# Patient Record
Sex: FEMALE | Race: WHITE | Employment: OTHER | URBAN - METROPOLITAN AREA
[De-identification: names, ages, dates, MRNs, and addresses within clinical notes are randomized per-mention and may not be internally consistent; named-entity substitution may affect disease eponyms.]

---

## 2017-01-28 ENCOUNTER — HOSPITAL ENCOUNTER (OUTPATIENT)
Dept: WOMENS IMAGING | Age: 67
Discharge: HOME | End: 2017-01-28
Attending: FAMILY MEDICINE | Admitting: FAMILY MEDICINE

## 2017-01-28 DIAGNOSIS — Z12.31 VISIT FOR SCREENING MAMMOGRAM: ICD-10-CM

## 2018-05-08 ENCOUNTER — HOSPITAL ENCOUNTER (OUTPATIENT)
Dept: WOMENS IMAGING | Age: 68
Discharge: HOME | End: 2018-05-08
Admitting: OBSTETRICS & GYNECOLOGY

## 2018-05-08 DIAGNOSIS — Z13.820 ENCOUNTER FOR SCREENING FOR OSTEOPOROSIS: ICD-10-CM

## 2018-05-08 DIAGNOSIS — Z13.820 OSTEOPOROSIS SCREENING: ICD-10-CM

## 2018-05-08 DIAGNOSIS — Z12.39 BREAST CANCER SCREENING: ICD-10-CM

## 2019-07-12 ENCOUNTER — HOSPITAL ENCOUNTER (OUTPATIENT)
Dept: WOMENS IMAGING | Age: 69
Discharge: HOME | End: 2019-07-12
Payer: MEDICARE

## 2019-07-12 DIAGNOSIS — Z12.31 VISIT FOR SCREENING MAMMOGRAM: ICD-10-CM

## 2019-07-12 PROCEDURE — 77067 SCR MAMMO BI INCL CAD: CPT

## 2019-07-25 RX ORDER — AMITRIPTYLINE HYDROCHLORIDE 25 MG/1
TABLET, FILM COATED ORAL NIGHTLY
COMMUNITY
Start: 2019-02-04

## 2019-07-25 RX ORDER — MULTIVIT WITH MINERALS/LUTEIN
1000 TABLET ORAL
COMMUNITY

## 2019-07-25 RX ORDER — MELOXICAM 15 MG/1
TABLET ORAL NIGHTLY
COMMUNITY
Start: 2019-07-10 | End: 2019-07-25 | Stop reason: SDUPTHER

## 2019-07-25 RX ORDER — MELOXICAM 15 MG/1
15 TABLET ORAL
COMMUNITY

## 2019-07-25 RX ORDER — UBIDECARENONE 30 MG
1 CAPSULE ORAL DAILY
COMMUNITY

## 2019-07-25 RX ORDER — SERTRALINE HYDROCHLORIDE 25 MG/1
25 TABLET, FILM COATED ORAL
COMMUNITY
Start: 2018-08-29

## 2019-07-25 RX ORDER — TIZANIDINE 4 MG/1
TABLET ORAL NIGHTLY
COMMUNITY
Start: 2019-06-18

## 2019-07-25 RX ORDER — ASPIRIN 81 MG/1
81 TABLET ORAL
COMMUNITY

## 2019-07-25 RX ORDER — LEVOTHYROXINE SODIUM 0.05 MG/1
TABLET ORAL DAILY
COMMUNITY
Start: 2013-05-22

## 2019-07-25 RX ORDER — VALACYCLOVIR HYDROCHLORIDE 1 G/1
1000 TABLET, FILM COATED ORAL DAILY PRN
COMMUNITY
Start: 2019-04-17

## 2019-07-25 RX ORDER — OMEPRAZOLE 20 MG/1
CAPSULE, DELAYED RELEASE ORAL DAILY
COMMUNITY
Start: 2019-06-15 | End: 2021-09-14

## 2019-07-30 ENCOUNTER — APPOINTMENT (OUTPATIENT)
Dept: GENERAL RADIOLOGY | Age: 69
End: 2019-07-30
Attending: PODIATRIST
Payer: MEDICARE

## 2019-07-30 ENCOUNTER — ANESTHESIA (OUTPATIENT)
Dept: OPERATING ROOM | Age: 69
End: 2019-07-30
Payer: MEDICARE

## 2019-07-30 ENCOUNTER — ANESTHESIA EVENT (OUTPATIENT)
Dept: OPERATING ROOM | Age: 69
End: 2019-07-30
Payer: MEDICARE

## 2019-07-30 ENCOUNTER — HOSPITAL ENCOUNTER (OUTPATIENT)
Age: 69
Setting detail: OUTPATIENT SURGERY
Discharge: HOME | End: 2019-07-30
Attending: PODIATRIST | Admitting: PODIATRIST
Payer: MEDICARE

## 2019-07-30 VITALS
BODY MASS INDEX: 28.25 KG/M2 | HEART RATE: 70 BPM | DIASTOLIC BLOOD PRESSURE: 70 MMHG | SYSTOLIC BLOOD PRESSURE: 135 MMHG | RESPIRATION RATE: 12 BRPM | TEMPERATURE: 97.8 F | OXYGEN SATURATION: 100 % | WEIGHT: 165.5 LBS | HEIGHT: 64 IN

## 2019-07-30 VITALS — OXYGEN SATURATION: 100 % | SYSTOLIC BLOOD PRESSURE: 103 MMHG | DIASTOLIC BLOOD PRESSURE: 51 MMHG

## 2019-07-30 DIAGNOSIS — G89.18 POST-OP PAIN: Primary | ICD-10-CM

## 2019-07-30 PROCEDURE — 2500000003 HC RX 250 WO HCPCS: Performed by: PODIATRIST

## 2019-07-30 PROCEDURE — 2580000003 HC RX 258: Performed by: NURSE ANESTHETIST, CERTIFIED REGISTERED

## 2019-07-30 PROCEDURE — 7100000010 HC PHASE II RECOVERY - FIRST 15 MIN: Performed by: PODIATRIST

## 2019-07-30 PROCEDURE — 3600000012 HC SURGERY LEVEL 2 ADDTL 15MIN: Performed by: PODIATRIST

## 2019-07-30 PROCEDURE — 2500000003 HC RX 250 WO HCPCS: Performed by: NURSE ANESTHETIST, CERTIFIED REGISTERED

## 2019-07-30 PROCEDURE — 2580000003 HC RX 258: Performed by: PODIATRIST

## 2019-07-30 PROCEDURE — 2709999900 HC NON-CHARGEABLE SUPPLY: Performed by: PODIATRIST

## 2019-07-30 PROCEDURE — 3700000000 HC ANESTHESIA ATTENDED CARE: Performed by: PODIATRIST

## 2019-07-30 PROCEDURE — 3700000001 HC ADD 15 MINUTES (ANESTHESIA): Performed by: PODIATRIST

## 2019-07-30 PROCEDURE — 73630 X-RAY EXAM OF FOOT: CPT

## 2019-07-30 PROCEDURE — 7100000000 HC PACU RECOVERY - FIRST 15 MIN: Performed by: PODIATRIST

## 2019-07-30 PROCEDURE — 3600000002 HC SURGERY LEVEL 2 BASE: Performed by: PODIATRIST

## 2019-07-30 PROCEDURE — 7100000001 HC PACU RECOVERY - ADDTL 15 MIN: Performed by: PODIATRIST

## 2019-07-30 PROCEDURE — 7100000011 HC PHASE II RECOVERY - ADDTL 15 MIN: Performed by: PODIATRIST

## 2019-07-30 PROCEDURE — 6360000002 HC RX W HCPCS: Performed by: NURSE ANESTHETIST, CERTIFIED REGISTERED

## 2019-07-30 PROCEDURE — 6370000000 HC RX 637 (ALT 250 FOR IP): Performed by: PODIATRIST

## 2019-07-30 PROCEDURE — 6360000002 HC RX W HCPCS: Performed by: PODIATRIST

## 2019-07-30 RX ORDER — GINSENG 100 MG
CAPSULE ORAL
Status: COMPLETED | OUTPATIENT
Start: 2019-07-30 | End: 2019-07-30

## 2019-07-30 RX ORDER — LIDOCAINE HYDROCHLORIDE 10 MG/ML
0.5 INJECTION, SOLUTION EPIDURAL; INFILTRATION; INTRACAUDAL; PERINEURAL ONCE
Status: DISCONTINUED | OUTPATIENT
Start: 2019-07-30 | End: 2019-07-30 | Stop reason: HOSPADM

## 2019-07-30 RX ORDER — FENTANYL CITRATE 50 UG/ML
25 INJECTION, SOLUTION INTRAMUSCULAR; INTRAVENOUS EVERY 5 MIN PRN
Status: DISCONTINUED | OUTPATIENT
Start: 2019-07-30 | End: 2019-07-30 | Stop reason: HOSPADM

## 2019-07-30 RX ORDER — SODIUM CHLORIDE 0.9 % (FLUSH) 0.9 %
10 SYRINGE (ML) INJECTION EVERY 12 HOURS SCHEDULED
Status: CANCELLED | OUTPATIENT
Start: 2019-07-30

## 2019-07-30 RX ORDER — PROCHLORPERAZINE EDISYLATE 5 MG/ML
5 INJECTION INTRAMUSCULAR; INTRAVENOUS
Status: DISCONTINUED | OUTPATIENT
Start: 2019-07-30 | End: 2019-07-30 | Stop reason: HOSPADM

## 2019-07-30 RX ORDER — LIDOCAINE HYDROCHLORIDE 10 MG/ML
INJECTION, SOLUTION EPIDURAL; INFILTRATION; INTRACAUDAL; PERINEURAL
Status: COMPLETED | OUTPATIENT
Start: 2019-07-30 | End: 2019-07-30

## 2019-07-30 RX ORDER — HYDROCODONE BITARTRATE AND ACETAMINOPHEN 5; 325 MG/1; MG/1
1 TABLET ORAL EVERY 4 HOURS PRN
Qty: 30 TABLET | Refills: 0 | Status: SHIPPED | OUTPATIENT
Start: 2019-07-30 | End: 2019-08-04

## 2019-07-30 RX ORDER — FENTANYL CITRATE 50 UG/ML
INJECTION, SOLUTION INTRAMUSCULAR; INTRAVENOUS PRN
Status: DISCONTINUED | OUTPATIENT
Start: 2019-07-30 | End: 2019-07-30 | Stop reason: SDUPTHER

## 2019-07-30 RX ORDER — LABETALOL HYDROCHLORIDE 5 MG/ML
5 INJECTION, SOLUTION INTRAVENOUS EVERY 10 MIN PRN
Status: DISCONTINUED | OUTPATIENT
Start: 2019-07-30 | End: 2019-07-30 | Stop reason: HOSPADM

## 2019-07-30 RX ORDER — SODIUM CHLORIDE, SODIUM LACTATE, POTASSIUM CHLORIDE, CALCIUM CHLORIDE 600; 310; 30; 20 MG/100ML; MG/100ML; MG/100ML; MG/100ML
INJECTION, SOLUTION INTRAVENOUS CONTINUOUS
Status: DISCONTINUED | OUTPATIENT
Start: 2019-07-30 | End: 2019-07-30 | Stop reason: HOSPADM

## 2019-07-30 RX ORDER — SODIUM CHLORIDE, SODIUM LACTATE, POTASSIUM CHLORIDE, CALCIUM CHLORIDE 600; 310; 30; 20 MG/100ML; MG/100ML; MG/100ML; MG/100ML
INJECTION, SOLUTION INTRAVENOUS CONTINUOUS
Status: CANCELLED | OUTPATIENT
Start: 2019-07-30

## 2019-07-30 RX ORDER — LIDOCAINE HYDROCHLORIDE 20 MG/ML
INJECTION, SOLUTION EPIDURAL; INFILTRATION; INTRACAUDAL; PERINEURAL PRN
Status: DISCONTINUED | OUTPATIENT
Start: 2019-07-30 | End: 2019-07-30 | Stop reason: SDUPTHER

## 2019-07-30 RX ORDER — ONDANSETRON 2 MG/ML
4 INJECTION INTRAMUSCULAR; INTRAVENOUS
Status: DISCONTINUED | OUTPATIENT
Start: 2019-07-30 | End: 2019-07-30 | Stop reason: HOSPADM

## 2019-07-30 RX ORDER — LIDOCAINE HYDROCHLORIDE 10 MG/ML
1 INJECTION, SOLUTION EPIDURAL; INFILTRATION; INTRACAUDAL; PERINEURAL
Status: CANCELLED | OUTPATIENT
Start: 2019-07-30 | End: 2019-07-30

## 2019-07-30 RX ORDER — BUPIVACAINE HYDROCHLORIDE 5 MG/ML
INJECTION, SOLUTION EPIDURAL; INTRACAUDAL
Status: COMPLETED | OUTPATIENT
Start: 2019-07-30 | End: 2019-07-30

## 2019-07-30 RX ORDER — SODIUM CHLORIDE 0.9 % (FLUSH) 0.9 %
10 SYRINGE (ML) INJECTION PRN
Status: CANCELLED | OUTPATIENT
Start: 2019-07-30

## 2019-07-30 RX ORDER — SODIUM CHLORIDE 9 MG/ML
INJECTION, SOLUTION INTRAVENOUS CONTINUOUS PRN
Status: DISCONTINUED | OUTPATIENT
Start: 2019-07-30 | End: 2019-07-30 | Stop reason: SDUPTHER

## 2019-07-30 RX ORDER — HYDRALAZINE HYDROCHLORIDE 20 MG/ML
5 INJECTION INTRAMUSCULAR; INTRAVENOUS EVERY 10 MIN PRN
Status: DISCONTINUED | OUTPATIENT
Start: 2019-07-30 | End: 2019-07-30 | Stop reason: HOSPADM

## 2019-07-30 RX ORDER — HYDROMORPHONE HCL 110MG/55ML
0.5 PATIENT CONTROLLED ANALGESIA SYRINGE INTRAVENOUS EVERY 5 MIN PRN
Status: DISCONTINUED | OUTPATIENT
Start: 2019-07-30 | End: 2019-07-30 | Stop reason: HOSPADM

## 2019-07-30 RX ORDER — OXYCODONE HYDROCHLORIDE AND ACETAMINOPHEN 5; 325 MG/1; MG/1
1 TABLET ORAL
Status: DISCONTINUED | OUTPATIENT
Start: 2019-07-30 | End: 2019-07-30 | Stop reason: HOSPADM

## 2019-07-30 RX ORDER — PROPOFOL 10 MG/ML
INJECTION, EMULSION INTRAVENOUS CONTINUOUS PRN
Status: DISCONTINUED | OUTPATIENT
Start: 2019-07-30 | End: 2019-07-30 | Stop reason: SDUPTHER

## 2019-07-30 RX ORDER — CEFAZOLIN SODIUM 2 G/100ML
2 INJECTION, SOLUTION INTRAVENOUS
Status: COMPLETED | OUTPATIENT
Start: 2019-07-30 | End: 2019-07-30

## 2019-07-30 RX ADMIN — SODIUM CHLORIDE: 9 INJECTION, SOLUTION INTRAVENOUS at 09:14

## 2019-07-30 RX ADMIN — LIDOCAINE HYDROCHLORIDE 100 MG: 20 INJECTION, SOLUTION EPIDURAL; INFILTRATION; INTRACAUDAL; PERINEURAL at 09:14

## 2019-07-30 RX ADMIN — PROPOFOL 140 MCG/KG/MIN: 10 INJECTION, EMULSION INTRAVENOUS at 09:14

## 2019-07-30 RX ADMIN — CEFAZOLIN SODIUM 2 G: 2 INJECTION, SOLUTION INTRAVENOUS at 09:08

## 2019-07-30 RX ADMIN — FENTANYL CITRATE 50 MCG: 50 INJECTION, SOLUTION INTRAMUSCULAR; INTRAVENOUS at 09:17

## 2019-07-30 RX ADMIN — SODIUM CHLORIDE, POTASSIUM CHLORIDE, SODIUM LACTATE AND CALCIUM CHLORIDE: 600; 310; 30; 20 INJECTION, SOLUTION INTRAVENOUS at 07:52

## 2019-07-30 ASSESSMENT — PULMONARY FUNCTION TESTS
PIF_VALUE: 1
PIF_VALUE: 0
PIF_VALUE: 1
PIF_VALUE: 1
PIF_VALUE: 0
PIF_VALUE: 1
PIF_VALUE: 0
PIF_VALUE: 1
PIF_VALUE: 0
PIF_VALUE: 1

## 2019-07-30 ASSESSMENT — PAIN DESCRIPTION - DESCRIPTORS: DESCRIPTORS: ACHING

## 2019-07-30 ASSESSMENT — PAIN - FUNCTIONAL ASSESSMENT
PAIN_FUNCTIONAL_ASSESSMENT: PREVENTS OR INTERFERES SOME ACTIVE ACTIVITIES AND ADLS
PAIN_FUNCTIONAL_ASSESSMENT: 0-10

## 2019-07-30 ASSESSMENT — ENCOUNTER SYMPTOMS: SHORTNESS OF BREATH: 0

## 2019-07-30 NOTE — BRIEF OP NOTE
Brief Postoperative Note  ______________________________________________________________    Patient: Pérez Gomez  YOB: 1950  MRN: [id number]  Date of Procedure: 7/30/2019    Pre-Op Diagnosis: PLANTAR FASCIITIS LEFT FOOT M72.2    Post-Op Diagnosis: Same       Procedure(s):  INSTEP PROCEDURE LEFT FOOT    Anesthesia: Monitor Anesthesia Care    Surgeon(s):  Linh Espino DPM    Assistant:   Kieran Garcia DPM PGY-3  Renae Hernandez MS-4     Estimated Blood Loss (mL): less than 50     Hemostasis: ankle tourniquet set at 250mmhg for 19 min    Injections: 10cc lidocaine plain 1%, 10cc marcaine plain 4.3%    Complications: None    Specimens:   * No specimens in log *    Implants:  * No implants in log *      Drains: * No LDAs found *    Findings: as expected.      Kathleen Gonzalez DPM  Date: 7/30/2019  Time: 9:53 AM

## 2019-07-30 NOTE — ANESTHESIA POSTPROCEDURE EVALUATION
Department of Anesthesiology  Postprocedure Note    Patient: Galina Khan  MRN: [id number]  YOB: 1950  Date of evaluation: 7/30/2019  Time:  11:00 AM     Procedure Summary     Date:  07/30/19 Room / Location:  [facility] OR [address] [place] [address] ASC OR    Anesthesia Start:  0910 Anesthesia Stop:  1000    Procedure:  INSTEP PROCEDURE LEFT FOOT (Left Foot) Diagnosis:  (PLANTAR FASCIITIS LEFT FOOT M72.2)    Surgeon:  Ramya Nova DPM Responsible Provider:  Alysha Can MD    Anesthesia Type:  MAC ASA Status:  2          Anesthesia Type: MAC    Neo Phase I: Neo Score: 8    Neo Phase II:      Last vitals: Reviewed and per EMR flowsheets.        Anesthesia Post Evaluation    Patient location during evaluation: PACU  Patient participation: complete - patient participated  Level of consciousness: awake and alert  Airway patency: patent  Nausea & Vomiting: no nausea and no vomiting  Complications: no  Cardiovascular status: hemodynamically stable  Respiratory status: acceptable  Hydration status: stable

## 2019-07-30 NOTE — PROGRESS NOTES
Discharge instructions reviewed with patient/responsible adult. All home medications have been reviewed, questions answered and patient and daughter verbalized understanding. Discharge instructions signed and copies given. Patient discharged per w/c with belongings.

## 2020-10-02 ENCOUNTER — HOSPITAL ENCOUNTER (OUTPATIENT)
Dept: WOMENS IMAGING | Age: 70
Discharge: HOME | End: 2020-10-02
Payer: MEDICARE

## 2020-10-02 PROCEDURE — 77063 BREAST TOMOSYNTHESIS BI: CPT

## 2021-08-03 ENCOUNTER — OFFICE VISIT (OUTPATIENT)
Dept: ENT CLINIC | Age: 71
End: 2021-08-03
Payer: MEDICARE

## 2021-08-03 VITALS
BODY MASS INDEX: 24.24 KG/M2 | HEIGHT: 64 IN | DIASTOLIC BLOOD PRESSURE: 90 MMHG | WEIGHT: 142 LBS | SYSTOLIC BLOOD PRESSURE: 140 MMHG

## 2021-08-03 DIAGNOSIS — S02.2XXA CLOSED FRACTURE OF NASAL BONE, INITIAL ENCOUNTER: Primary | ICD-10-CM

## 2021-08-03 DIAGNOSIS — J34.89 NASAL OBSTRUCTION: ICD-10-CM

## 2021-08-03 DIAGNOSIS — J31.0 CHRONIC RHINITIS: ICD-10-CM

## 2021-08-03 DIAGNOSIS — J34.2 DEVIATED NASAL SEPTUM: ICD-10-CM

## 2021-08-03 DIAGNOSIS — R04.0 EPISTAXIS: ICD-10-CM

## 2021-08-03 PROCEDURE — G8420 CALC BMI NORM PARAMETERS: HCPCS | Performed by: STUDENT IN AN ORGANIZED HEALTH CARE EDUCATION/TRAINING PROGRAM

## 2021-08-03 PROCEDURE — 1123F ACP DISCUSS/DSCN MKR DOCD: CPT | Performed by: STUDENT IN AN ORGANIZED HEALTH CARE EDUCATION/TRAINING PROGRAM

## 2021-08-03 PROCEDURE — 1090F PRES/ABSN URINE INCON ASSESS: CPT | Performed by: STUDENT IN AN ORGANIZED HEALTH CARE EDUCATION/TRAINING PROGRAM

## 2021-08-03 PROCEDURE — 4004F PT TOBACCO SCREEN RCVD TLK: CPT | Performed by: STUDENT IN AN ORGANIZED HEALTH CARE EDUCATION/TRAINING PROGRAM

## 2021-08-03 PROCEDURE — 4040F PNEUMOC VAC/ADMIN/RCVD: CPT | Performed by: STUDENT IN AN ORGANIZED HEALTH CARE EDUCATION/TRAINING PROGRAM

## 2021-08-03 PROCEDURE — 3017F COLORECTAL CA SCREEN DOC REV: CPT | Performed by: STUDENT IN AN ORGANIZED HEALTH CARE EDUCATION/TRAINING PROGRAM

## 2021-08-03 PROCEDURE — 31231 NASAL ENDOSCOPY DX: CPT | Performed by: STUDENT IN AN ORGANIZED HEALTH CARE EDUCATION/TRAINING PROGRAM

## 2021-08-03 PROCEDURE — G8427 DOCREV CUR MEDS BY ELIG CLIN: HCPCS | Performed by: STUDENT IN AN ORGANIZED HEALTH CARE EDUCATION/TRAINING PROGRAM

## 2021-08-03 PROCEDURE — 99204 OFFICE O/P NEW MOD 45 MIN: CPT | Performed by: STUDENT IN AN ORGANIZED HEALTH CARE EDUCATION/TRAINING PROGRAM

## 2021-08-03 PROCEDURE — G8399 PT W/DXA RESULTS DOCUMENT: HCPCS | Performed by: STUDENT IN AN ORGANIZED HEALTH CARE EDUCATION/TRAINING PROGRAM

## 2021-08-03 NOTE — PROGRESS NOTES
[address] [place] [address] (:  1950) is a 78 y.o. female, here for evaluation of the following chief complaint(s):  Epistaxis      ASSESSMENT/PLAN:  1. Closed fracture of nasal bone, initial encounter  2. Nasal obstruction  3. Deviated nasal septum  4. Chronic rhinitis  5. Epistaxis      This is a very pleasant 78 y.o. female here today for evaluation of the the above-noted complaints. The patient has a prior history of a traumatic fall with nasal bone fracture approximately 6 months ago. She now has a deviated nasal dorsum with a narrowed nasal valve on the right combined with a right-sided septal deviation. I suspect that this is contributing to her nasal obstruction on the side. If she were to need surgery to open up her nose she may need an open septorhinoplasty with osteotomies. Regarding the patient's chronic nasal drainage, I am suspicious this may be vasomotor rhinitis. I would like to treat her with ipratropium bromide to see if this can help. Regarding the patient's epistaxis, there are some prominent blood vessels along the right anterior nasal septum. I have asked patient to apply Vaseline to her nares and start utilizing nasal saline gel spray or mist.  If she still gets persistent nosebleeds and we will plan to cauterize that side. SUBJECTIVE/OBJECTIVE:  BAO    Joyce Alba is here today for evaluation of issues related to her nose. The patient states that approximately 6 months ago she had a fall and fractured her nose. Since that time she has had persistent nasal obstruction on that side. Additionally, she gets intermittent nasal drainage which is constant throughout the day. This is clear. She denies being able to collect in a cup and states that it is not salty or metallic in flavor. She also has been getting intermittent light nosebleeds from the right side of her nose.   This is been ongoing for some time. REVIEW OF SYSTEMS  The following systems were reviewed and revealed the following in addition to any already discussed in the HPI:    PHYSICAL EXAM    GENERAL: No acute distress, alert and oriented, no hoarseness, strong voice  EYES: EOMI, Anti-icteric  HENT:   Head: Normocephalic and atraumatic. Face:  Symmetric, facial nerve intact, no sinus tenderness  Right Ear: Normal external ear, normal external auditory canal, intact tympanic membrane with normal mobility and aerated middle ear  Left Ear: Normal external ear, normal external auditory canal, intact tympanic membrane with normal mobility and aerated middle ear  Mouth/Oral Cavity:  normal lips, Uvula is midline, no mucosal lesions, no trismus, without dentition, normal salivary quality/flow  Oropharynx/Larynx:  normal oropharynx, surgically absent tonsils; patient did not tolerate mirror exam due to excessive gag reflex  Nose:Normal external nasal appearance. Anterior rhinoscopy shows  a deviated septum preventing view posteriorly. Mild enlargement turbinates. Normal mucosa   NECK: Normal range of motion, no thyromegaly, trachea is midline, no lymphadenopathy, no neck masses, no crepitus  CHEST: Normal respiratory effort, no retractions, breathing comfortably  SKIN: No rashes, normal appearing skin, no evidence of skin lesions/tumors  Neuro:  cranial nerve II-XII intact; normal gait  Cardio:  no edema        PROCEDURE  Nasal Endoscopy (CPT code 31970)    Preop: chronic rhinitis  Postop: Same    Verbal consent was received. After topical anesthesia and decongestion had been obtained using aerosolized 1% lidocaine and oxymetazoline, a 45 degree rigid endoscope was placed into both nares with the patient in a sitting position.  The following was observed:    Right Nasal Cavity and Paranasal Sinuses:  Polyp score = 0 (0 = no polyps, 1 = small polyps in middle meatus not reaching below the inferior border of the middle ammy, 2 = polyps reaching below the middle border of the middle turbinate, 3= large polyps reaching the lower border of the inferior turbinate or polyps medial to the middle mamy, 4= large polyps causing almost complete congestion/obstruction of the interior meatus)  Edema score = 1 (0 = absent, 1 = mild, 2 = severe)  Discharge score = 1 (0 = no discharge, 1 = clear thin discharge, 2 = thick purulent discharge)    Left Nasal Cavity and Paranasal Sinuses:    Polyp score = 0 (0 = no polyps, 1 = small polyps in middle meatus not reaching below the inferior border of the middle ammy, 2 = polyps reaching below the middle border of the middle turbinate, 3= large polyps reaching the lower border of the inferior turbinate or polyps medial to the middle ammy, 4= large polyps causing almost complete congestion/obstruction of the interior meatus)  Edema score = 1 (0 = absent, 1 = mild, 2 = severe)  Discharge score = 1 (0 = no discharge, 1 = clear thin discharge, 2 = thick purulent discharge)    Septum: intact and deviated to the right  Other:   -The inferior and middle turbinates were examined. The middle meatus, and sphenoethmoid recess was examined bilaterally.    -There is evidence of a rightward nasal septal deviation with a narrow nasal valve on the right. There are some prominent blood vessels along the right anterior nasal septum. -There were no complications. Tolerated well without complication. I attest that I was present for and did the entire procedure myself. This note was generated completely or in part utilizing Dragon dictation speech recognition software. Occasionally, words are mistranscribed and despite editing, the text may contain inaccuracies due to incorrect word recognition. If further clarification is needed please contact the office at [phone]. An electronic signature was used to authenticate this note.     --Becki Etienne MD

## 2021-08-04 ENCOUNTER — TELEPHONE (OUTPATIENT)
Dept: ENT CLINIC | Age: 71
End: 2021-08-04

## 2021-08-04 RX ORDER — IPRATROPIUM BROMIDE 21 UG/1
2 SPRAY, METERED NASAL EVERY 12 HOURS
Qty: 1 BOTTLE | Refills: 3 | Status: SHIPPED | OUTPATIENT
Start: 2021-08-04

## 2021-08-26 RX ORDER — IPRATROPIUM BROMIDE 42 UG/1
SPRAY, METERED NASAL
Qty: 1 BOTTLE | Refills: 3 | Status: SHIPPED | OUTPATIENT
Start: 2021-08-26

## 2021-09-14 ENCOUNTER — OFFICE VISIT (OUTPATIENT)
Dept: ENT CLINIC | Age: 71
End: 2021-09-14
Payer: MEDICARE

## 2021-09-14 VITALS
DIASTOLIC BLOOD PRESSURE: 80 MMHG | WEIGHT: 139 LBS | BODY MASS INDEX: 23.73 KG/M2 | HEIGHT: 64 IN | SYSTOLIC BLOOD PRESSURE: 120 MMHG

## 2021-09-14 DIAGNOSIS — J34.89 NASAL VALVE COLLAPSE: ICD-10-CM

## 2021-09-14 DIAGNOSIS — S02.2XXD CLOSED FRACTURE OF NASAL BONE WITH ROUTINE HEALING, SUBSEQUENT ENCOUNTER: Primary | ICD-10-CM

## 2021-09-14 DIAGNOSIS — J34.2 DEVIATED NASAL SEPTUM: ICD-10-CM

## 2021-09-14 DIAGNOSIS — K21.9 LARYNGOPHARYNGEAL REFLUX (LPR): ICD-10-CM

## 2021-09-14 DIAGNOSIS — J34.89 NASAL OBSTRUCTION: ICD-10-CM

## 2021-09-14 DIAGNOSIS — R04.0 EPISTAXIS: ICD-10-CM

## 2021-09-14 DIAGNOSIS — J31.0 CHRONIC RHINITIS: ICD-10-CM

## 2021-09-14 PROCEDURE — 1123F ACP DISCUSS/DSCN MKR DOCD: CPT | Performed by: STUDENT IN AN ORGANIZED HEALTH CARE EDUCATION/TRAINING PROGRAM

## 2021-09-14 PROCEDURE — G8420 CALC BMI NORM PARAMETERS: HCPCS | Performed by: STUDENT IN AN ORGANIZED HEALTH CARE EDUCATION/TRAINING PROGRAM

## 2021-09-14 PROCEDURE — 4004F PT TOBACCO SCREEN RCVD TLK: CPT | Performed by: STUDENT IN AN ORGANIZED HEALTH CARE EDUCATION/TRAINING PROGRAM

## 2021-09-14 PROCEDURE — G8427 DOCREV CUR MEDS BY ELIG CLIN: HCPCS | Performed by: STUDENT IN AN ORGANIZED HEALTH CARE EDUCATION/TRAINING PROGRAM

## 2021-09-14 PROCEDURE — 1090F PRES/ABSN URINE INCON ASSESS: CPT | Performed by: STUDENT IN AN ORGANIZED HEALTH CARE EDUCATION/TRAINING PROGRAM

## 2021-09-14 PROCEDURE — G8399 PT W/DXA RESULTS DOCUMENT: HCPCS | Performed by: STUDENT IN AN ORGANIZED HEALTH CARE EDUCATION/TRAINING PROGRAM

## 2021-09-14 PROCEDURE — 99213 OFFICE O/P EST LOW 20 MIN: CPT | Performed by: STUDENT IN AN ORGANIZED HEALTH CARE EDUCATION/TRAINING PROGRAM

## 2021-09-14 PROCEDURE — 3017F COLORECTAL CA SCREEN DOC REV: CPT | Performed by: STUDENT IN AN ORGANIZED HEALTH CARE EDUCATION/TRAINING PROGRAM

## 2021-09-14 PROCEDURE — 4040F PNEUMOC VAC/ADMIN/RCVD: CPT | Performed by: STUDENT IN AN ORGANIZED HEALTH CARE EDUCATION/TRAINING PROGRAM

## 2021-09-14 RX ORDER — OMEPRAZOLE 40 MG/1
CAPSULE, DELAYED RELEASE ORAL
Qty: 30 CAPSULE | Refills: 5 | Status: SHIPPED | OUTPATIENT
Start: 2021-09-14 | End: 2021-10-11

## 2021-09-14 NOTE — PROGRESS NOTES
drainage which is constant throughout the day. This is clear. She denies being able to collect in a cup and states that it is not salty or metallic in flavor. She also has been getting intermittent light nosebleeds from the right side of her nose. This is been ongoing for some time. Update 9/14/2021:    The patient presents today for follow-up. Overall she is doing better from a nasal drainage standpoint. She still feels like her nose is blocked on both the right and left. She still notes that she has changed the appearance of her nose after her recent fall. She is getting some mucus in the back of her throat. This is stable from previously. REVIEW OF SYSTEMS  The following systems were reviewed and revealed the following in addition to any already discussed in the HPI:    PHYSICAL EXAM    GENERAL: No acute distress, alert and oriented, no hoarseness, strong voice  EYES: EOMI, Anti-icteric  HENT:   Head: Normocephalic and atraumatic. Face:  Symmetric, facial nerve intact, no sinus tenderness  Right Ear: Normal external ear, normal external auditory canal, intact tympanic membrane with normal mobility and aerated middle ear  Left Ear: Normal external ear, normal external auditory canal, intact tympanic membrane with normal mobility and aerated middle ear  Mouth/Oral Cavity:  normal lips, Uvula is midline, no mucosal lesions, no trismus, without dentition, normal salivary quality/flow  Oropharynx/Larynx:  normal oropharynx, surgically absent tonsils; patient did not tolerate mirror exam due to excessive gag reflex  Nose:Normal external nasal appearance. Anterior rhinoscopy shows  a deviated septum preventing view posteriorly. Mild enlargement turbinates.   Normal mucosa   NECK: Normal range of motion, no thyromegaly, trachea is midline, no lymphadenopathy, no neck masses, no crepitus  CHEST: Normal respiratory effort, no retractions, breathing comfortably  SKIN: No rashes, normal appearing skin, no evidence of skin lesions/tumors  Neuro:  cranial nerve II-XII intact; normal gait  Cardio:  no edema        PROCEDURE  Nasal Endoscopy (CPT code 21675) from prior visit    Preop: chronic rhinitis  Postop: Same    Verbal consent was received. After topical anesthesia and decongestion had been obtained using aerosolized 1% lidocaine and oxymetazoline, a 45 degree rigid endoscope was placed into both nares with the patient in a sitting position. The following was observed:    Right Nasal Cavity and Paranasal Sinuses:  Polyp score = 0 (0 = no polyps, 1 = small polyps in middle meatus not reaching below the inferior border of the middle ammy, 2 = polyps reaching below the middle border of the middle turbinate, 3= large polyps reaching the lower border of the inferior turbinate or polyps medial to the middle ammy, 4= large polyps causing almost complete congestion/obstruction of the interior meatus)  Edema score = 1 (0 = absent, 1 = mild, 2 = severe)  Discharge score = 1 (0 = no discharge, 1 = clear thin discharge, 2 = thick purulent discharge)    Left Nasal Cavity and Paranasal Sinuses:    Polyp score = 0 (0 = no polyps, 1 = small polyps in middle meatus not reaching below the inferior border of the middle ammy, 2 = polyps reaching below the middle border of the middle turbinate, 3= large polyps reaching the lower border of the inferior turbinate or polyps medial to the middle ammy, 4= large polyps causing almost complete congestion/obstruction of the interior meatus)  Edema score = 1 (0 = absent, 1 = mild, 2 = severe)  Discharge score = 1 (0 = no discharge, 1 = clear thin discharge, 2 = thick purulent discharge)    Septum: intact and deviated to the right  Other:   -The inferior and middle turbinates were examined. The middle meatus, and sphenoethmoid recess was examined bilaterally.    -There is evidence of a rightward nasal septal deviation with a narrow nasal valve on the right.   There are some prominent blood vessels along the right anterior nasal septum. -There were no complications. Tolerated well without complication. I attest that I was present for and did the entire procedure myself. This note was generated completely or in part utilizing Dragon dictation speech recognition software. Occasionally, words are mistranscribed and despite editing, the text may contain inaccuracies due to incorrect word recognition. If further clarification is needed please contact the office at [phone]. An electronic signature was used to authenticate this note.     --Janice Cruz MD

## 2021-10-06 ENCOUNTER — HOSPITAL ENCOUNTER (OUTPATIENT)
Dept: WOMENS IMAGING | Age: 71
Discharge: HOME | End: 2021-10-06
Payer: MEDICARE

## 2021-10-06 DIAGNOSIS — Z12.31 VISIT FOR SCREENING MAMMOGRAM: ICD-10-CM

## 2021-10-06 PROCEDURE — 77063 BREAST TOMOSYNTHESIS BI: CPT

## 2021-10-11 RX ORDER — OMEPRAZOLE 40 MG/1
CAPSULE, DELAYED RELEASE ORAL
Qty: 30 CAPSULE | Refills: 5 | Status: SHIPPED | OUTPATIENT
Start: 2021-10-11 | End: 2022-03-28

## 2022-03-28 RX ORDER — OMEPRAZOLE 40 MG/1
CAPSULE, DELAYED RELEASE ORAL
Qty: 90 CAPSULE | Refills: 1 | Status: SHIPPED | OUTPATIENT
Start: 2022-03-28 | End: 2022-09-21

## 2022-03-28 NOTE — TELEPHONE ENCOUNTER
Refill Request:     Last Office Visit:  9/14/2021     Next Scheduled Visit : Visit date not found     Medication Requested:    Pharmacy:    [address] [place] [address]. Rosetta Malone [phone] - F [phone] [address] [facility].   [place] [id number]  Phone: [phone] Fax: [phone]

## 2022-09-20 ENCOUNTER — OFFICE VISIT (OUTPATIENT)
Dept: ENT CLINIC | Age: 72
End: 2022-09-20
Payer: MEDICARE

## 2022-09-20 VITALS
BODY MASS INDEX: 23.56 KG/M2 | SYSTOLIC BLOOD PRESSURE: 144 MMHG | HEIGHT: 64 IN | HEART RATE: 86 BPM | WEIGHT: 138 LBS | DIASTOLIC BLOOD PRESSURE: 78 MMHG

## 2022-09-20 DIAGNOSIS — H91.93 BILATERAL HEARING LOSS, UNSPECIFIED HEARING LOSS TYPE: ICD-10-CM

## 2022-09-20 DIAGNOSIS — J34.2 DEVIATED NASAL SEPTUM: ICD-10-CM

## 2022-09-20 DIAGNOSIS — J34.89 NASAL OBSTRUCTION: ICD-10-CM

## 2022-09-20 DIAGNOSIS — S02.2XXD CLOSED FRACTURE OF NASAL BONE WITH ROUTINE HEALING, SUBSEQUENT ENCOUNTER: ICD-10-CM

## 2022-09-20 DIAGNOSIS — H92.02 LEFT EAR PAIN: Primary | ICD-10-CM

## 2022-09-20 PROCEDURE — 92504 EAR MICROSCOPY EXAMINATION: CPT | Performed by: STUDENT IN AN ORGANIZED HEALTH CARE EDUCATION/TRAINING PROGRAM

## 2022-09-20 PROCEDURE — G8420 CALC BMI NORM PARAMETERS: HCPCS | Performed by: STUDENT IN AN ORGANIZED HEALTH CARE EDUCATION/TRAINING PROGRAM

## 2022-09-20 PROCEDURE — 1090F PRES/ABSN URINE INCON ASSESS: CPT | Performed by: STUDENT IN AN ORGANIZED HEALTH CARE EDUCATION/TRAINING PROGRAM

## 2022-09-20 PROCEDURE — 3017F COLORECTAL CA SCREEN DOC REV: CPT | Performed by: STUDENT IN AN ORGANIZED HEALTH CARE EDUCATION/TRAINING PROGRAM

## 2022-09-20 PROCEDURE — 4004F PT TOBACCO SCREEN RCVD TLK: CPT | Performed by: STUDENT IN AN ORGANIZED HEALTH CARE EDUCATION/TRAINING PROGRAM

## 2022-09-20 PROCEDURE — G8399 PT W/DXA RESULTS DOCUMENT: HCPCS | Performed by: STUDENT IN AN ORGANIZED HEALTH CARE EDUCATION/TRAINING PROGRAM

## 2022-09-20 PROCEDURE — 99214 OFFICE O/P EST MOD 30 MIN: CPT | Performed by: STUDENT IN AN ORGANIZED HEALTH CARE EDUCATION/TRAINING PROGRAM

## 2022-09-20 PROCEDURE — 1123F ACP DISCUSS/DSCN MKR DOCD: CPT | Performed by: STUDENT IN AN ORGANIZED HEALTH CARE EDUCATION/TRAINING PROGRAM

## 2022-09-20 PROCEDURE — G8427 DOCREV CUR MEDS BY ELIG CLIN: HCPCS | Performed by: STUDENT IN AN ORGANIZED HEALTH CARE EDUCATION/TRAINING PROGRAM

## 2022-09-20 RX ORDER — CLOBETASOL PROPIONATE 0.5 MG/G
CREAM TOPICAL
Qty: 15 G | Refills: 1 | Status: SHIPPED | OUTPATIENT
Start: 2022-09-20

## 2022-09-20 NOTE — PROGRESS NOTES
[address] [place] [address] (:  1950) is a 81 y.o. female, here for evaluation of the following chief complaint(s):  Ear Problem (Left ear pain 2 months now )      ASSESSMENT/PLAN:  1. Left ear pain  2. Bilateral hearing loss, unspecified hearing loss type  3. Nasal obstruction  4. Deviated nasal septum  5. Closed fracture of nasal bone with routine healing, subsequent encounter      This is a very pleasant 81 y.o. female here today for evaluation of the the above-noted complaints. With regards to her new onset otalgia, I suspect that this is related to an improperly fitting hearing aid event. The area of tenderness is located in the tail of parotid region but there is no masses there and her oral cavity and oropharynx appear clear. I will start her on clobetasol up to the left external auditory canal.  A prescription was sent to the pharmacy for this today. The patient has a prior history of a traumatic fall with nasal bone fracture approximately 6 months ago. She now has a deviated nasal dorsum with a narrowed nasal valve on the right combined with a right-sided septal deviation  I suspect that this is contributing to her nasal obstruction on the side. If she were to need surgery to open up her nose she may need an open septorhinoplasty with osteotomies. We discussed that we are going to continue to observe this. Continue Atrovent for vasomotor rhinitis. Continue reflux as needed for mucus in the back of the throat. Epistaxis resolved. SUBJECTIVE/OBJECTIVE:  Epistaxis     Ear Problem      Joelle Angel is here today for evaluation of issues related to her nose. The patient states that approximately 6 months ago she had a fall and fractured her nose. Since that time she has had persistent nasal obstruction on that side. Additionally, she gets intermittent nasal drainage which is constant throughout the day.   This is clear. She denies being able to collect in a cup and states that it is not salty or metallic in flavor. She also has been getting intermittent light nosebleeds from the right side of her nose. This is been ongoing for some time. Update 9/14/2021:    The patient presents today for follow-up. Overall she is doing better from a nasal drainage standpoint. She still feels like her nose is blocked on both the right and left. She still notes that she has changed the appearance of her nose after her recent fall. She is getting some mucus in the back of her throat. This is stable from previously. Update 9/20/2022:    Patient presents today for follow-up regarding issues related to her ears. States that she has had about 2 months of intermittent dull aching pain in her left ear. She went to see her audiologist who bided her hearing aids and was told to see her ENT. Patient describes the pain is intermittent. Is not getting worse. She denies dysphagia. Feels like her hearing is getting worse on that side. She is still using her nasal sprays and saline irrigations for her nasal obstruction and chronic rhinitis. Feels that these are stable. No epistaxis. REVIEW OF SYSTEMS  The following systems were reviewed and revealed the following in addition to any already discussed in the HPI:    PHYSICAL EXAM    GENERAL: No acute distress, alert and oriented, no hoarseness, strong voice  EYES: EOMI, Anti-icteric  HENT:   Head: Normocephalic and atraumatic. Face:  Symmetric, facial nerve intact, no sinus tenderness    Mouth/Oral Cavity:  normal lips, Uvula is midline, no mucosal lesions, no trismus, without dentition, normal salivary quality/flow  Oropharynx/Larynx:  normal oropharynx, surgically absent tonsils; patient did not tolerate mirror exam due to excessive gag reflex  Nose:Normal external nasal appearance. Anterior rhinoscopy shows  a deviated septum preventing view posteriorly.   Mild enlargement turbinates. Normal mucosa   NECK: Normal range of motion, no thyromegaly, trachea is midline, no lymphadenopathy, no neck masses, no crepitus          PROCEDURE  Binocular otoscopic exam CPT 87598: The right ear was examined with the binocular otoscope. The external auditory canal was normal.  The tympanic membrane was intact with an aerated middle ear. The left ear was then examined. Mild inflammation of the external auditory canal.  The tympanic membrane was intact with an aerated middle ear. Nasal Endoscopy (CPT code 75407) from prior visit    Preop: chronic rhinitis  Postop: Same    Verbal consent was received. After topical anesthesia and decongestion had been obtained using aerosolized 1% lidocaine and oxymetazoline, a 45 degree rigid endoscope was placed into both nares with the patient in a sitting position.  The following was observed:    Right Nasal Cavity and Paranasal Sinuses:  Polyp score = 0 (0 = no polyps, 1 = small polyps in middle meatus not reaching below the inferior border of the middle ammy, 2 = polyps reaching below the middle border of the middle turbinate, 3= large polyps reaching the lower border of the inferior turbinate or polyps medial to the middle ammy, 4= large polyps causing almost complete congestion/obstruction of the interior meatus)  Edema score = 1 (0 = absent, 1 = mild, 2 = severe)  Discharge score = 1 (0 = no discharge, 1 = clear thin discharge, 2 = thick purulent discharge)    Left Nasal Cavity and Paranasal Sinuses:    Polyp score = 0 (0 = no polyps, 1 = small polyps in middle meatus not reaching below the inferior border of the middle ammy, 2 = polyps reaching below the middle border of the middle turbinate, 3= large polyps reaching the lower border of the inferior turbinate or polyps medial to the middle ammy, 4= large polyps causing almost complete congestion/obstruction of the interior meatus)  Edema score = 1 (0 = absent, 1 = mild, 2 = severe)  Discharge score = 1 (0 = no discharge, 1 = clear thin discharge, 2 = thick purulent discharge)    Septum: intact and deviated to the right  Other:   -The inferior and middle turbinates were examined. The middle meatus, and sphenoethmoid recess was examined bilaterally.    -There is evidence of a rightward nasal septal deviation with a narrow nasal valve on the right. There are some prominent blood vessels along the right anterior nasal septum. -There were no complications. Tolerated well without complication. I attest that I was present for and did the entire procedure myself. This note was generated completely or in part utilizing Dragon dictation speech recognition software. Occasionally, words are mistranscribed and despite editing, the text may contain inaccuracies due to incorrect word recognition. If further clarification is needed please contact the office at [phone]. An electronic signature was used to authenticate this note.     --Jeffrey Becker MD

## 2022-09-21 RX ORDER — OMEPRAZOLE 40 MG/1
CAPSULE, DELAYED RELEASE ORAL
Qty: 90 CAPSULE | Refills: 1 | Status: SHIPPED | OUTPATIENT
Start: 2022-09-21

## 2022-09-21 NOTE — TELEPHONE ENCOUNTER
Patient was here yesterday and can not afford the prescription that was called in. Could something else be called in instead?     clobetasol (TEMOVATE) 0.05 % cream [[id number]

## 2022-10-06 RX ORDER — TRIAMCINOLONE ACETONIDE 0.25 MG/G
CREAM TOPICAL
Qty: 15 G | Refills: 0 | Status: SHIPPED | OUTPATIENT
Start: 2022-10-06

## 2022-10-19 ENCOUNTER — HOSPITAL ENCOUNTER (OUTPATIENT)
Dept: WOMENS IMAGING | Age: 72
Discharge: HOME | End: 2022-10-19
Payer: MEDICARE

## 2022-10-19 DIAGNOSIS — Z12.31 VISIT FOR SCREENING MAMMOGRAM: ICD-10-CM

## 2022-10-19 PROCEDURE — 77063 BREAST TOMOSYNTHESIS BI: CPT

## 2023-03-24 ENCOUNTER — HOSPITAL ENCOUNTER (OUTPATIENT)
Dept: PHYSICAL THERAPY | Age: 73
Setting detail: THERAPIES SERIES
Discharge: HOME | End: 2023-03-24
Payer: MEDICARE

## 2023-03-24 PROCEDURE — 97110 THERAPEUTIC EXERCISES: CPT | Performed by: PHYSICAL THERAPIST

## 2023-03-24 PROCEDURE — 97161 PT EVAL LOW COMPLEX 20 MIN: CPT | Performed by: PHYSICAL THERAPIST

## 2023-03-24 PROCEDURE — 97140 MANUAL THERAPY 1/> REGIONS: CPT | Performed by: PHYSICAL THERAPIST

## 2023-03-24 NOTE — FLOWSHEET NOTE
10x10\"   Chin tucks plus rotation     Chin tucks plus extension     Scapular retraction isometrics 10x10\"         TB scapular retraction     TB shoulder extn     B ER  with scap ret (no money)     serratus                                        Manual treatment:     Mech traction 10'    Manual traction 4'    Suboccipital  4'         IASTM  NPV           HEP instruction: Pt was instructed in, and safely and correctly demonstrated home exercise program. Patient verbalized understanding of proper frequency of exercises. Copy of exercises was scanned into patient chart and can be fount in the media file. Access Code: [email or web address]  URL: [employer]/  Date: 03/24/2023  Prepared by: Praveen Sue     Exercises  - Seated Cervical Rotation AROM  - 1 x daily - 7 x weekly - 1 sets - 10 reps - 10 hold  - Seated Cervical Flexion AROM  - 1 x daily - 7 x weekly - 1 sets - 10 reps - 10 hold  - Seated Cervical Sidebending AROM  - 1 x daily - 7 x weekly - 1 sets - 10 reps - 10 hold  - Supine Cervical Retraction with Towel  - 1 x daily - 7 x weekly - 1 sets - 10 reps - 10 hold  - Seated Scapular Retraction  - 1 x daily - 7 x weekly - 1 sets - 10 reps - 10 hold  - Seated Upper Trapezius Stretch  - 1 x daily - 7 x weekly - 1 sets - 3 reps - 30 hold  - Gentle Levator Scapulae Stretch  - 1 x daily - 7 x weekly - 1 sets - 3 reps - 30 hold      Patient education: Pt was educated on PT diagnosis, prognosis, and plan of care. Therapeutic Exercise and NMR EXR  [x] (63552) Provided verbal/tactile cueing for activities related to strengthening, flexibility, endurance, ROM  for improvements in proximal hip and core control with self care, mobility, lifting and ambulation.  [] (94909) Provided verbal/tactile cueing for activities related to improving balance, coordination, kinesthetic sense, posture, motor skill, proprioception  to assist with core control in self care, mobility, lifting, and ambulation.      Therapeutic

## 2023-03-24 NOTE — PLAN OF CARE
[facility] [address], [address] [place] [address], [address] [place] [address]  Phone: [phone]   Fax: [phone]          Physical Therapy Certification    Dear Sandrine Robins DO,    We had the pleasure of evaluating the following patient for physical therapy services at [address] [place] [address]. A summary of our findings can be found in the initial assessment below. This includes our plan of care. If you have any questions or concerns regarding these findings, please do not hesitate to contact me at the office phone number checked above. Thank you for the referral.     Physician Signature:_______________________________Date:__________________  By signing above (or electronic signature), therapists plan is approved by physician      Patient: Selene Patton   : 1950   MRN: [id number]  Referring Provider:  Sandrine Robins DO       Evaluation Date: 3/24/2023      Medical Diagnosis:  Tension-type headache, unspecified, not intractable [G44.209]  Cervicogenic headache [G44.86]  Occipital neuralgia [M54.81]  Other long term (current) drug therapy [Z79.899]  Treatment Diagnosis:  Tension-type headache, unspecified, not intractable [G44.209]  Cervicogenic headache [G44.86]  Occipital neuralgia [M54.81]  Other long term (current) drug therapy [Z79.899]                       Precautions/ Contra-indications: see enclosed. C-SSRS Triggered by Intake questionnaire (Past 2 wk assessment):   [x] No, Questionnaire did not trigger screening.   [] Yes, Patient intake triggered further evaluation      [] C-SSRS Screening completed  [] PCP notified via Plan of Care  [] Emergency services notified     Latex Allergy:  [x]NO      []YES  Preferred Language for Healthcare:   [x]English       []other:    SUBJECTIVE: Patient stated complaint: She has 7 plus years of HA and is being seen in the past for cervical neck pain in PT.  She had + success with PT in the past

## 2023-03-27 ENCOUNTER — HOSPITAL ENCOUNTER (OUTPATIENT)
Dept: PHYSICAL THERAPY | Age: 73
Setting detail: THERAPIES SERIES
Discharge: HOME | End: 2023-03-27
Payer: MEDICARE

## 2023-03-27 NOTE — FLOWSHEET NOTE
[address] [address] [facility]    Physical Therapy  Cancellation/No-show Note  Patient Name:  Jason Danielson  :  1950   Date:  3/27/2023  Cancelled visits to date: 1  No-shows to date: 0    For today's appointment patient:  [x]  Cancelled  []  Rescheduled appointment  []  No-show     Reason given by patient:  []  Patient ill  []  Conflicting appointment  []  No transportation    []  Conflict with work  []  No reason given  [x]  Other:  Taking  to ER- possible stroke   Comments:      Electronically signed by:  Randal Simpson, PT, MPT

## 2023-03-31 ENCOUNTER — HOSPITAL ENCOUNTER (OUTPATIENT)
Dept: PHYSICAL THERAPY | Age: 73
Setting detail: THERAPIES SERIES
Discharge: HOME | End: 2023-03-31
Payer: MEDICARE

## 2023-03-31 PROCEDURE — 97012 MECHANICAL TRACTION THERAPY: CPT | Performed by: PHYSICAL THERAPIST

## 2023-03-31 PROCEDURE — 97140 MANUAL THERAPY 1/> REGIONS: CPT | Performed by: PHYSICAL THERAPIST

## 2023-03-31 PROCEDURE — 97110 THERAPEUTIC EXERCISES: CPT | Performed by: PHYSICAL THERAPIST

## 2023-03-31 NOTE — FLOWSHEET NOTE
[] Goals require adjustment due to lack of progress  [] Patient is not progressing as expected and requires additional follow up with physician  [] Other    Prognosis for POC: [x] Good [] Fair  [] Poor      Patient requires continued skilled intervention: [x] Yes  [] No      ASSESSMENT:      Treatment/Activity Tolerance:  [x] Patient tolerated treatment well [] Patient limited by fatique  [] Patient limited by pain  [] Patient limited by other medical complications  [x] Other: The patient tolerated tx well. No negative effects with STM. UT guarded and banding due to chronic stiffness. Updated HEP with new pics. Return to Play: (if applicable)   []  Stage 1: Intro to Strength   []  Stage 2: Return to Run and Strength   []  Stage 3: Return to Jump and Strength   []  Stage 4: Dynamic Strength and Agility   []  Stage 5: Sport Specific Training     []  Ready to Return to Play, Meets All Above Stages   []  Not Ready for Return to Sports   Comments:            Prognosis: [x] Good [] Fair  [] Poor    Patient Requires Follow-up: [x] Yes  [] No    PLAN: Progress as tolerated  [x] Continue per plan of care [] Alter current plan (see comments above)  [] Plan of care initiated [] Hold pending MD visit [] Discharge    Note: If patient does not return for scheduled/ recommended follow up visits, this note will serve as a discharge from care along with most recent update on progress.      Electronically signed by: RUTHY Marie

## 2023-04-03 ENCOUNTER — HOSPITAL ENCOUNTER (OUTPATIENT)
Dept: PHYSICAL THERAPY | Age: 73
Setting detail: THERAPIES SERIES
Discharge: HOME | End: 2023-04-03
Payer: MEDICARE

## 2023-04-03 PROCEDURE — 97140 MANUAL THERAPY 1/> REGIONS: CPT | Performed by: PHYSICAL THERAPIST

## 2023-04-03 PROCEDURE — 97012 MECHANICAL TRACTION THERAPY: CPT | Performed by: PHYSICAL THERAPIST

## 2023-04-03 PROCEDURE — 97110 THERAPEUTIC EXERCISES: CPT | Performed by: PHYSICAL THERAPIST

## 2023-04-03 NOTE — FLOWSHEET NOTE
prognosis, and plan of care. Therapeutic Exercise and NMR EXR  [x] (38757) Provided verbal/tactile cueing for activities related to strengthening, flexibility, endurance, ROM  for improvements in proximal hip and core control with self care, mobility, lifting and ambulation.  [] (41423) Provided verbal/tactile cueing for activities related to improving balance, coordination, kinesthetic sense, posture, motor skill, proprioception  to assist with core control in self care, mobility, lifting, and ambulation. Therapeutic Activities:    [x] (05492 or 59932) Provided verbal/tactile cueing for activities related to improving balance, coordination, kinesthetic sense, posture, motor skill, proprioception and motor activation to allow for proper function  with self care and ADLs  [] (78035) Provided training and instruction to the patient for proper core and proximal hip recruitment and positioning with ambulation re-education     Home Exercise Program:    [x] (01154) Reviewed/Progressed HEP activities related to strengthening, flexibility, endurance, ROM of core, proximal hip and LE for functional self-care, mobility, lifting and ambulation   [] (95838) Reviewed/Progressed HEP activities related to improving balance, coordination, kinesthetic sense, posture, motor skill, proprioception of core, proximal hip and LE for self care, mobility, lifting, and ambulation      Manual Treatments:  PROM / STM / Oscillations-Mobs:  G-I, II, III, IV (PA's, Inf., Post.)  [] (52206) Provided manual therapy to mobilize proximal hip and LS spine soft tissue/joints for the purpose of modulating pain, promoting relaxation,  increasing ROM, reducing/eliminating soft tissue swelling/inflammation/restriction, improving soft tissue extensibility and allowing for proper ROM for normal function with self care, mobility, lifting and ambulation.        Modalities:  MH- 13'    [] GAME READY (VASO)- for significant edema, swelling, pain

## 2023-04-07 ENCOUNTER — HOSPITAL ENCOUNTER (OUTPATIENT)
Dept: PHYSICAL THERAPY | Age: 73
Setting detail: THERAPIES SERIES
Discharge: HOME | End: 2023-04-07
Payer: MEDICARE

## 2023-04-07 PROCEDURE — 97140 MANUAL THERAPY 1/> REGIONS: CPT | Performed by: PHYSICAL THERAPIST

## 2023-04-07 PROCEDURE — 97112 NEUROMUSCULAR REEDUCATION: CPT | Performed by: PHYSICAL THERAPIST

## 2023-04-07 PROCEDURE — 97110 THERAPEUTIC EXERCISES: CPT | Performed by: PHYSICAL THERAPIST

## 2023-04-07 PROCEDURE — 97012 MECHANICAL TRACTION THERAPY: CPT | Performed by: PHYSICAL THERAPIST

## 2023-04-07 NOTE — FLOWSHEET NOTE
due to lack of progress  [] Patient is not progressing as expected and requires additional follow up with physician  [] Other    Prognosis for POC: [x] Good [] Fair  [] Poor      Patient requires continued skilled intervention: [x] Yes  [] No      ASSESSMENT:      Treatment/Activity Tolerance:  [x] Patient tolerated treatment well [] Patient limited by fatique  [] Patient limited by pain  [] Patient limited by other medical complications  [x] Other: Continued mm guarding and stiffness to UT and LS. Consult MD about DN. Added shld ext without any increase in sxs. Return to Play: (if applicable)   []  Stage 1: Intro to Strength   []  Stage 2: Return to Run and Strength   []  Stage 3: Return to Jump and Strength   []  Stage 4: Dynamic Strength and Agility   []  Stage 5: Sport Specific Training     []  Ready to Return to Play, Meets All Above Stages   []  Not Ready for Return to Sports   Comments:            Prognosis: [x] Good [] Fair  [] Poor    Patient Requires Follow-up: [x] Yes  [] No    PLAN: Progress as tolerated  [x] Continue per plan of care [] Alter current plan (see comments above)  [] Plan of care initiated [] Hold pending MD visit [] Discharge    Note: If patient does not return for scheduled/ recommended follow up visits, this note will serve as a discharge from care along with most recent update on progress.      Electronically signed by: RUTHY Hawkins

## 2023-04-19 ENCOUNTER — HOSPITAL ENCOUNTER (OUTPATIENT)
Dept: PHYSICAL THERAPY | Age: 73
Setting detail: THERAPIES SERIES
Discharge: HOME | End: 2023-04-19
Payer: MEDICARE

## 2023-04-19 PROCEDURE — 20560 NDL INSJ W/O NJX 1 OR 2 MUSC: CPT | Performed by: PHYSICAL THERAPIST

## 2023-04-19 PROCEDURE — 97140 MANUAL THERAPY 1/> REGIONS: CPT | Performed by: PHYSICAL THERAPIST

## 2023-04-19 NOTE — FLOWSHEET NOTE
ABD           Shoulder IR           Shoulder ER at 89                 RESTRICTIONS/PRECAUTIONS: none    Exercises/Interventions:     Exercise/Equipment Resistance/Repetitions Other comments        Upper trap stretch 30 3   Levator scapulae stretch 30 3   Flexion 10 x 10\"    Rotation B 10 x 10\"    Side-bend B 10 x10\"    Thoracic extension 10x10\"    Thoracic rotation               Chin tucks Supine with towel roll 10x10\"   Chin tucks plus rotation     Chin tucks plus extension             TB scapular retraction Red 3 x 10     TB shoulder extn Red 2 x 10     B ER  with scap ret (no money)     serratus                                        Manual treatment:     Manual traction / 2nd rib mobs  10'    Suboccipital  6'    Side glides c-spine 3'    IASTM  10' to UT and LS    DN       Spoke with Villa Walsh DO  regarding the use of Dry Needling     Dry needling manual therapy: consisted on the placement of 2 needles in the following muscles:  UT.  A 50 mm needle was inserted, piston, rotated, and coned to produce intramuscular mobilization. These techniques were used to restore functional range of motion, reduce muscle spasm and induce healing in the corresponding musculature. (09627)  Clean Technique was utilized today while applying Dry needling treatment. The treatment sites where cleaned with 70% solution of  isopropyl alcohol . The PT washed their hands and utilized treatment gloves along with hand  prior to inserting the needles. All needles where removed and discarded in the appropriate sharps container. MD has given verbal and/or written approval for this treatment. 2 MTRP       HEP instruction: Pt was instructed in, and safely and correctly demonstrated home exercise program. Patient verbalized understanding of proper frequency of exercises. Copy of exercises was scanned into patient chart and can be fount in the media file. Access Code: [email or web address]  URL: [email or web address]  Date:

## 2023-04-20 ENCOUNTER — HOSPITAL ENCOUNTER (OUTPATIENT)
Dept: OCCUPATIONAL THERAPY | Age: 73
Setting detail: THERAPIES SERIES
Discharge: HOME | End: 2023-04-20
Payer: MEDICARE

## 2023-04-20 PROCEDURE — 97165 OT EVAL LOW COMPLEX 30 MIN: CPT

## 2023-04-20 PROCEDURE — 97110 THERAPEUTIC EXERCISES: CPT

## 2023-04-20 NOTE — PROGRESS NOTES
Occupational Therapy     Outpatient Occupational Therapy  [] [facility] [address]   Phone: [phone]   Fax: [phone]   [x] [facility]  Phone: [phone]   Fax: [phone]  [] Miesha Goodrich   Phone: [phone]   Fax: [phone]      To:   Zhanna Hall MD     Patient: Mateo Alegria  : 1950  MRN: [id number]  Evaluation Date: 2023      Diagnosis Information:  Diagnosis: M11.20 (ICD-10-CM) - Other chondrocalcinosis, unspecified site  M19.90 (ICD-10-CM) - Unspecified osteoarthritis, unspecified site   OT Insurance Information: Claudetta Mariscal  Dear Dr. Wesley Weber,   The following patient has been evaluated for occupational therapy services and for therapy to continue, Medicare requires monthly physician review of the treatment plan. Please review the attached evaluation and/or summary of the patient's plan of care, and verify that you agree therapy should continue by signing the attached document and sending it back to our office.     Plan of Care/Treatment to date:  [x] Therapeutic Exercise   [x] Modalities:  [] Therapeutic Activity    [] Ultrasound  [] Electrical Stimulation   [] Activities of Daily Living    [] Fluidotherapy [] Kinesiotaping  [] Neuromuscular Re-education   [] Iontophoresis [x] Coldpack/hotpack   [x] Instruction in HEP     [] Contrast Bath  [] Manual Therapy     Other: Paraffin  [] Aquatic Therapy      [x]       Frequency/Duration:  # Days per week: [x] 1 day # Weeks: [] 1 week [] 5 weeks      [] 2 days   [] 2 weeks [] 6 weeks     [] 3 days   [] 3 weeks [] 7 weeks     [] 4 days   [x] 4 weeks [] 8 weeks    Rehab Potential: [x] excellent [x] good [] fair  [] poor     Goals:      Patient Goals       Patient goals  decrease pain in B hands   Short Term Goals      Time Frame for Short Term Goals 4 weeks   Short Term Goal 1 Pt will be Ind with HEP and report compelting daily   STG 1 Current Status: --   STG Goal 1 Status: --   Short Term Goal 2 Pt reina
Occupational Therapy  Occupational Therapy Initial Assessment  Date:  2023    Patient Name: Luis Stevenson  MRN: [id number]     :  1950     Treatment Diagnosis: increased pain and decreased strength B hands    Restrictions:     Subjective:   General  Chart Reviewed: Yes  Patient assessed for rehabilitation services?: Yes  Additional Pertinent Hx: Pt is a 81 y.o. female who presents to OP OT with complaints of ongoing pain in B hands due to arthitis. Pt reports pain worse in R hand vs L hand. Self reported health status[de-identified] Good  History obtained from[de-identified] Patient  Family/Caregiver Present: No  Diagnosis: M11.20 (ICD-10-CM) - Other chondrocalcinosis, unspecified site  M19.90 (ICD-10-CM) - Unspecified osteoarthritis, unspecified site  Referring Provider (secondary): Merlin Ramp, MD  Follows Commands: Within Functional Limits  OT Visit Information  Onset Date: 23  OT Insurance Information: MEDICARE     Social History:   Social History  Lives With: Alone  Type of Home: Condo  Home Layout: One level  Functional Status:  Functional Status  Prior level of function: Independent  Occupation: Part time employment  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: Independent  Homemaking Responsibilities: Yes  Ambulation Assistance: Independent  Transfer Assistance: Independent  Active : Yes     Objective:              Activity Tolerance  Activity Tolerance: Patient tolerated evaluation without incident              Cognition  Overall Cognitive Status: WFL                 LUE AROM (degrees)  LUE AROM : WFL  Left Hand AROM (degrees)  Left Hand AROM: WFL  RUE AROM (degrees)  RUE AROM : WFL  Right Hand AROM (degrees)  Right Hand AROM: WFL        Hand Dominance  Hand Dominance: Right  Left Hand Strength -  (lbs)  Handle Setting 2: 48, 46, 45  Trial 1: 48  Trial 2: 46  Trial 3: 45  Average: 46.33  Left Hand Strength - Lateral Pinch (lbs)  Trial 1:
weeks   Short Term Goal 1 Pt will be Ind with HEP and report compelting daily   STG 1 Current Status: --   STG Goal 1 Status: --   Short Term Goal 2 Pt reina report <2/10 pain in B hands at rest   STG 2 Current Status: --   STG Goal 2 Status: --   Short Term Goal 3 Pt will increase B hand  strength by 5# for increased ability to complete work tasks   STG 3 Current Status: --   STG Goal 3 Status: --   Short Term Goal 4 Pt will decrease QuickDASH score by 3 points for increased ability to complete ADLs/IADLs   STG 4 Current Status: --   STG Goal 4 Status:         Patient Requires Follow-up:  []  Yes  []  No    Plan: []  Continue per plan of care []  Alter current plan (see comments)   [x]  Plan of care initiated []  Hold pending MD visit []  Discharge  P  emmanuelle for Next Session:      Electronically signed by:  Alena Marquez OT,OTR/L

## 2023-04-21 ENCOUNTER — HOSPITAL ENCOUNTER (OUTPATIENT)
Dept: PHYSICAL THERAPY | Age: 73
Setting detail: THERAPIES SERIES
Discharge: HOME | End: 2023-04-21
Payer: MEDICARE

## 2023-04-21 PROCEDURE — 97110 THERAPEUTIC EXERCISES: CPT | Performed by: PHYSICAL THERAPIST

## 2023-04-21 PROCEDURE — 97140 MANUAL THERAPY 1/> REGIONS: CPT | Performed by: PHYSICAL THERAPIST

## 2023-04-21 PROCEDURE — 20560 NDL INSJ W/O NJX 1 OR 2 MUSC: CPT | Performed by: PHYSICAL THERAPIST

## 2023-04-21 NOTE — FLOWSHEET NOTE
tolerated  [x] Continue per plan of care [] Alter current plan (see comments above)  [] Plan of care initiated [] Hold pending MD visit [] Discharge    Note: If patient does not return for scheduled/ recommended follow up visits, this note will serve as a discharge from care along with most recent update on progress.      Electronically signed by: Chris Dodson, PT MPT

## 2023-04-24 ENCOUNTER — HOSPITAL ENCOUNTER (OUTPATIENT)
Dept: PHYSICAL THERAPY | Age: 73
Setting detail: THERAPIES SERIES
Discharge: HOME | End: 2023-04-24
Payer: MEDICARE

## 2023-04-24 PROCEDURE — 97110 THERAPEUTIC EXERCISES: CPT | Performed by: PHYSICAL THERAPIST

## 2023-04-24 PROCEDURE — 97140 MANUAL THERAPY 1/> REGIONS: CPT | Performed by: PHYSICAL THERAPIST

## 2023-04-24 NOTE — FLOWSHEET NOTE
[address] [facility] [place], [address]  Phone: [phone]   Fax: [phone]      Physical Therapy Daily Treatment Note    Date:  2023     Patient Name:  Loco Cooper    :  1950  MRN: [id number]    Medical Diagnosis:  Tension-type headache, unspecified, not intractable [G44.209]  Cervicogenic headache [G44.86]  Occipital neuralgia [M54.81]  Other long term (current) drug therapy [Z79.899]  Treatment Diagnosis:  Tension-type headache, unspecified, not intractable [G44.209]  Cervicogenic headache [G44.86]  Occipital neuralgia [M54.81]  Other long term (current) drug therapy [N47.758]  Insurance/Certification information:  PT Insurance Information: Medicare  Referring Provider:  Gricel Palomares DO  Has the plan of care been signed (Y/N):        []  Yes  [x]  No     Date of Patient follow up with Physician: TBA      Is this a Progress Report:     []  Yes  [x]  No        If Yes:  Date Range for reporting period:  Beginning- 3/24/2023    Ending- 23    Progress report will be due (10 Rx or 30 days whichever is less): 10 visits or 7-15-15       Recertification will be due (POC Duration  / 90 days whichever is less): as above        Visit # Insurance Allowable Auth Required   9 medicare []  Yes [x]  No        Functional Scale:   NDI- 20%    Date assessed:  3/31/2023      Latex Allergy:  [x]NO      []YES  Preferred Language for Healthcare:   [x]English       []other:      Pain level:  4 to 8 /10  on 3/24/2023    SUBJECTIVE:  The patient note that she felt good after last where DN was done to bilateral UT. She was able to clean house afterwards without issues. She did indicate that she is tired and sore today due to work.      OBJECTIVE:   Observation:   Test measurements:    2023    AROM Left  Right   Comments   Cervical Flexion  65  xx       Cervical Extension  60  xx       Cervical Side bend  35 30        Cervical

## 2023-04-27 ENCOUNTER — HOSPITAL ENCOUNTER (OUTPATIENT)
Dept: OCCUPATIONAL THERAPY | Age: 73
Setting detail: THERAPIES SERIES
Discharge: HOME | End: 2023-04-27
Payer: MEDICARE

## 2023-04-27 PROCEDURE — 97110 THERAPEUTIC EXERCISES: CPT

## 2023-04-27 NOTE — PROGRESS NOTES
Occupational Therapy      Occupational Therapy Daily Treatment Note    Date:  2023    Patient Name:  Amanda Blackwood     :  1950  MRN: [id number]  Restrictions/Precautions:      Medical/Treatment Diagnosis Information:  Diagnosis: M11.20 (ICD-10-CM) - Other chondrocalcinosis, unspecified site  M19.90 (ICD-10-CM) - Unspecified osteoarthritis, unspecified site  Treatment Diagnosis: increased pain and decreased strength B hands  Insurance/Certification information:  OT Insurance Information: MEDICARE  Physician Information:   Octavia High MD  Plan of care signed (Y/N):  N  Visit# / total visits:  2   Pain level: 3/10 at rest and up to 5/10 with use     G-Code (if applicable):      Date / Visit # G-Code Applied:  /   QuickDASH Total Score: 17 (23)       QuickDASH Disability/Symptom Score : 13.64 % (23)    Progress Note: []  Yes  [x]  No  Next due by: Visit #10      Subjective: Pt reports on going B hand pain. 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[employer] [address]: [address]: Independent  Homemaking Responsibilities: Yes  Ambulation Assistance: Independent  Transfer Assistance: Independent  Active : Yes  Objective:     Activity Tolerance  Activity Tolerance: Patient tolerated evaluation without incident  Cognition  Overall Cognitive Status: WFL  LUE AROM (degrees)  LUE AROM : WFL  Left Hand AROM (degrees)  Left Hand AROM: WFL  RUE AROM (degrees)  RUE AROM : WFL  Right Hand AROM (degrees)  Right Hand AROM: WFL  Hand Dominance  Hand Dominance: Right  Left Hand Strength -  (lbs)  Handle Setting 2: 48, 46, 45  Trial 1: 48  Trial 2: 46  Trial 3: 45  Average: 46.33  Left Hand Strength - Lateral Pinch (lbs)  Trial 1: 10  Trial 2: 11  Trial 3: 11  Average: 10.67  Right Hand Strength -

## 2023-04-28 ENCOUNTER — HOSPITAL ENCOUNTER (OUTPATIENT)
Dept: PHYSICAL THERAPY | Age: 73
Setting detail: THERAPIES SERIES
Discharge: HOME | End: 2023-04-28
Payer: MEDICARE

## 2023-04-28 PROCEDURE — 97140 MANUAL THERAPY 1/> REGIONS: CPT | Performed by: PHYSICAL THERAPIST

## 2023-04-28 PROCEDURE — 20560 NDL INSJ W/O NJX 1 OR 2 MUSC: CPT | Performed by: PHYSICAL THERAPIST

## 2023-04-28 PROCEDURE — 97110 THERAPEUTIC EXERCISES: CPT | Performed by: PHYSICAL THERAPIST

## 2023-04-28 NOTE — FLOWSHEET NOTE
[address], [address] [facility] [place], [address]  Phone: [phone]   Fax: [phone]      Physical Therapy Daily Treatment Note    Date:  2023     Patient Name:  Mark Gonsales    :  1950  MRN: [id number]    Medical Diagnosis:  Tension-type headache, unspecified, not intractable [G44.209]  Cervicogenic headache [G44.86]  Occipital neuralgia [M54.81]  Other long term (current) drug therapy [Z79.899]  Treatment Diagnosis:  Tension-type headache, unspecified, not intractable [G44.209]  Cervicogenic headache [G44.86]  Occipital neuralgia [M54.81]  Other long term (current) drug therapy [I05.555]  Insurance/Certification information:  PT Insurance Information: Medicare  Referring Provider:  Sandrita Agee DO  Has the plan of care been signed (Y/N):        []  Yes  [x]  No     Date of Patient follow up with Physician: TBA      Is this a Progress Report:     []  Yes  [x]  No        If Yes:  Date Range for reporting period:  Beginning- 3/24/2023    Ending- 23    Progress report will be due (10 Rx or 30 days whichever is less): 10 visits or        Recertification will be due (POC Duration  / 90 days whichever is less): as above        Visit # Insurance Allowable Auth Required   10 medicare []  Yes [x]  No        Functional Scale:   NDI- 20%    Date assessed:  3/31/2023   NDI- 10%    Date assessed:  23      Latex Allergy:  [x]NO      []YES  Preferred Language for Healthcare:   [x]English       []other:      Pain level:  4 to 8 /10  on 3/24/2023    SUBJECTIVE:  The patient noted she is feeling much better. Both HA and neck pain are less. Today will be her last day.      OBJECTIVE:   Observation:   Test measurements:    2023    AROM Left  Right   Comments   Cervical Flexion  65  xx       Cervical Extension  60  xx       Cervical Side bend  35 35        Cervical Rotation  60%  50%                   Shoulder flex 0517b/[id number]

## 2023-05-04 ENCOUNTER — APPOINTMENT (OUTPATIENT)
Dept: OCCUPATIONAL THERAPY | Age: 73
End: 2023-05-04
Payer: MEDICARE

## 2023-05-11 ENCOUNTER — HOSPITAL ENCOUNTER (OUTPATIENT)
Dept: OCCUPATIONAL THERAPY | Age: 73
Setting detail: THERAPIES SERIES
Discharge: HOME | End: 2023-05-11

## 2023-05-11 NOTE — PROGRESS NOTES
Occupational Therapy      Occupational Therapy  Cancellation/No-show Note  Patient Name:  Lucero Song   :  1950   Date:  2023  Cancelled visits to date: 0  No-shows to date: 1    For today's appointment patient:  []    Cancelled  []    Rescheduled appointment  [x]    No-show     Reason given by patient:  []    Patient ill  []    Conflicting appointment  []    No transportation    []    Conflict with work  []    No reason given  [x]    Other:     Comments:  Call placed to pt. Pt reports thinking that this week was cancelled and her next appointment was for . Pt plans to resume next week.      Electronically signed by:  Marshall Canavan, OT, OTR/L

## 2023-05-18 ENCOUNTER — HOSPITAL ENCOUNTER (OUTPATIENT)
Dept: OCCUPATIONAL THERAPY | Age: 73
Setting detail: THERAPIES SERIES
Discharge: HOME | End: 2023-05-18
Payer: MEDICARE

## 2023-05-18 PROCEDURE — 97110 THERAPEUTIC EXERCISES: CPT

## 2023-05-18 NOTE — PROGRESS NOTES
Occupational Therapy      Outpatient Occupational Therapy  [] [facility] [address]   Phone: [phone]   Fax: [phone]   [x] [facility]           Phone: [phone]   Fax: [phone]  [] Meta [address]mann   Phone: [phone]   Fax: [phone]     Occupational Therapy Discharge Note  Date: 2023        Patient Name:  Arian Mcleod    :  1950  MRN: [id number]    Restrictions/Precautions:       Medical/Treatment Diagnosis Information:  Diagnosis: M11.20 (ICD-10-CM) - Other chondrocalcinosis, unspecified site  M19.90 (ICD-10-CM) - Unspecified osteoarthritis, unspecified site  Treatment Diagnosis: increased pain and decreased strength B hands  Insurance/Certification information:  OT Insurance Information: MEDICARE  Physician Information:   Cora Menchaca MD  Plan of care signed (Y/N):  N  Visit# / total visits:  3/   Pain level:      2-3/10 at rest and up to 4/10 with use                 G-Code (if applicable):                                             Date / Visit # G-Code Applied:  /   QuickDASH Total Score: 15 (23)       QuickDASH Disability/Symptom Score : 9.09 % (23)    Time Period for Report:  23-23  Cancels/No-shows to date:  1 no show    Plan of Care/Treatment to date: D/C from OT  [] Therapeutic Exercise    [] Modalities:  [] Therapeutic Activity     [] Ultrasound  [] Electrical Stimulation  [] Total Motion Release     [] Fluidotherapy [] Francy Kieran  [] Neuromuscular Re-education  [] Cold/hotpack [] Iontophoresis  [] Instruction in HEP     Other:  [] Manual Therapy      []            [] Aquatic Therapy      []                         Significant Findings At Last Visit/Comments:    Subjective:    Pt reports not able to get HEP as much as she would like. Pt reports pain continues to improved.       Objective:  Eval 23     Functional Status:  Functional Status  Prior level of function: Independent  Occupation: Part time employment  Type of Occupation: office work and

## 2023-05-18 NOTE — PROGRESS NOTES
Occupational Therapy      Occupational Therapy Daily Treatment Note    Date:  2023    Patient Name:  Adenike Tian     :  1950  MRN: [id number]  Restrictions/Precautions:      Medical/Treatment Diagnosis Information:  Diagnosis: M11.20 (ICD-10-CM) - Other chondrocalcinosis, unspecified site  M19.90 (ICD-10-CM) - Unspecified osteoarthritis, unspecified site  Treatment Diagnosis: increased pain and decreased strength B hands  Insurance/Certification information:  OT Insurance Information: MEDICARE  Physician Information:   Mikala Jackson MD  Plan of care signed (Y/N):  N  Visit# / total visits:  3/   Pain level: 2-3/10 at rest and up to 4/10 with use     G-Code (if applicable):      Date / Visit # G-Code Applied:  /   QuickDASH Total Score: 15 (23)       QuickDASH Disability/Symptom Score : 9.09 % (23)    Progress Note: [x]  Yes  []  No  Next due by: Visit #10      Subjective: Pt reports not able to get HEP as much as she would like. Pt reports pain continues to improved. 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  Homemaking Responsibilities: Yes  Ambulation Assistance: Independent  Transfer Assistance: Independent  Active : Yes  Objective:     Activity Tolerance  Activity Tolerance: Patient tolerated evaluation without incident  Cognition  Overall Cognitive Status: WFL  LUE AROM (degrees)  LUE AROM : WFL  Left Hand AROM (degrees)  Left Hand AROM: WFL  RUE AROM (degrees)  RUE AROM : WFL  Right Hand AROM (degrees)  Right Hand AROM: WFL  Hand Dominance  Hand Dominance: Right  Left Hand Strength -  (lbs)  Handle Setting 2: 48, 46, 45  Trial 1: 48  Trial 2: 46  Trial 3: 45  Average: 46.33  Left Hand Strength - Lateral Pinch (lbs)  Trial 1: 10  Trial 2: 11  Trial

## 2023-07-20 RX ORDER — OMEPRAZOLE 40 MG/1
CAPSULE, DELAYED RELEASE ORAL
Qty: 90 CAPSULE | Refills: 0 | Status: SHIPPED | OUTPATIENT
Start: 2023-07-20

## 2023-09-19 ENCOUNTER — TRANSCRIBE ORDERS (OUTPATIENT)
Dept: ADMINISTRATIVE | Age: 73
End: 2023-09-19

## 2023-09-19 DIAGNOSIS — R35.0 INCREASED FREQUENCY OF MICTURITION: Primary | ICD-10-CM

## 2023-09-19 DIAGNOSIS — Z87.440 PERSONAL HISTORY UTI: ICD-10-CM

## 2023-09-22 ENCOUNTER — HOSPITAL ENCOUNTER (OUTPATIENT)
Dept: ULTRASOUND IMAGING | Age: 73
Discharge: HOME | End: 2023-09-22
Attending: UROLOGY
Payer: MEDICARE

## 2023-09-22 DIAGNOSIS — R35.0 INCREASED FREQUENCY OF MICTURITION: ICD-10-CM

## 2023-09-22 DIAGNOSIS — Z87.440 PERSONAL HISTORY UTI: ICD-10-CM

## 2023-09-22 PROCEDURE — 76770 US EXAM ABDO BACK WALL COMP: CPT

## 2023-10-17 RX ORDER — OMEPRAZOLE 40 MG/1
CAPSULE, DELAYED RELEASE ORAL
Qty: 90 CAPSULE | Refills: 0 | Status: SHIPPED | OUTPATIENT
Start: 2023-10-17

## 2023-10-20 ENCOUNTER — HOSPITAL ENCOUNTER (OUTPATIENT)
Dept: WOMENS IMAGING | Age: 73
Discharge: HOME | End: 2023-10-20
Payer: MEDICARE

## 2023-10-20 VITALS — HEIGHT: 64 IN | BODY MASS INDEX: 21.85 KG/M2 | WEIGHT: 128 LBS

## 2023-10-20 DIAGNOSIS — Z12.31 BREAST CANCER SCREENING BY MAMMOGRAM: ICD-10-CM

## 2023-10-20 PROCEDURE — 77063 BREAST TOMOSYNTHESIS BI: CPT

## 2023-11-07 ENCOUNTER — OFFICE VISIT (OUTPATIENT)
Dept: ENT CLINIC | Age: 73
End: 2023-11-07
Payer: MEDICARE

## 2023-11-07 VITALS
HEIGHT: 64 IN | SYSTOLIC BLOOD PRESSURE: 137 MMHG | WEIGHT: 133 LBS | DIASTOLIC BLOOD PRESSURE: 76 MMHG | HEART RATE: 80 BPM | BODY MASS INDEX: 22.71 KG/M2

## 2023-11-07 DIAGNOSIS — H91.90 HEARING LOSS, UNSPECIFIED HEARING LOSS TYPE, UNSPECIFIED LATERALITY: Primary | ICD-10-CM

## 2023-11-07 DIAGNOSIS — J34.89 NASAL OBSTRUCTION: ICD-10-CM

## 2023-11-07 DIAGNOSIS — J34.2 DEVIATED NASAL SEPTUM: ICD-10-CM

## 2023-11-07 DIAGNOSIS — H91.93 BILATERAL HEARING LOSS, UNSPECIFIED HEARING LOSS TYPE: Primary | ICD-10-CM

## 2023-11-07 DIAGNOSIS — S02.2XXD CLOSED FRACTURE OF NASAL BONE WITH ROUTINE HEALING, SUBSEQUENT ENCOUNTER: ICD-10-CM

## 2023-11-07 DIAGNOSIS — J34.89 NASAL VESTIBULITIS: ICD-10-CM

## 2023-11-07 PROCEDURE — G8420 CALC BMI NORM PARAMETERS: HCPCS | Performed by: STUDENT IN AN ORGANIZED HEALTH CARE EDUCATION/TRAINING PROGRAM

## 2023-11-07 PROCEDURE — 1090F PRES/ABSN URINE INCON ASSESS: CPT | Performed by: STUDENT IN AN ORGANIZED HEALTH CARE EDUCATION/TRAINING PROGRAM

## 2023-11-07 PROCEDURE — 99213 OFFICE O/P EST LOW 20 MIN: CPT | Performed by: STUDENT IN AN ORGANIZED HEALTH CARE EDUCATION/TRAINING PROGRAM

## 2023-11-07 PROCEDURE — 31231 NASAL ENDOSCOPY DX: CPT | Performed by: STUDENT IN AN ORGANIZED HEALTH CARE EDUCATION/TRAINING PROGRAM

## 2023-11-07 PROCEDURE — 1123F ACP DISCUSS/DSCN MKR DOCD: CPT | Performed by: STUDENT IN AN ORGANIZED HEALTH CARE EDUCATION/TRAINING PROGRAM

## 2023-11-07 PROCEDURE — 3017F COLORECTAL CA SCREEN DOC REV: CPT | Performed by: STUDENT IN AN ORGANIZED HEALTH CARE EDUCATION/TRAINING PROGRAM

## 2023-11-07 PROCEDURE — G8484 FLU IMMUNIZE NO ADMIN: HCPCS | Performed by: STUDENT IN AN ORGANIZED HEALTH CARE EDUCATION/TRAINING PROGRAM

## 2023-11-07 PROCEDURE — G8427 DOCREV CUR MEDS BY ELIG CLIN: HCPCS | Performed by: STUDENT IN AN ORGANIZED HEALTH CARE EDUCATION/TRAINING PROGRAM

## 2023-11-07 PROCEDURE — G8399 PT W/DXA RESULTS DOCUMENT: HCPCS | Performed by: STUDENT IN AN ORGANIZED HEALTH CARE EDUCATION/TRAINING PROGRAM

## 2023-11-07 PROCEDURE — 1036F TOBACCO NON-USER: CPT | Performed by: STUDENT IN AN ORGANIZED HEALTH CARE EDUCATION/TRAINING PROGRAM

## 2023-11-07 NOTE — PROGRESS NOTES
Preop: chronic rhinitis  Postop: Same    Verbal consent was received. After topical anesthesia and decongestion had been obtained using aerosolized 1% lidocaine and oxymetazoline, a 45 degree rigid endoscope was placed into both nares with the patient in a sitting position. The following was observed:      Septum: intact and deviated to the right posteriorly with severe leftward deviation anteriorly  Other:   -The inferior and middle turbinates were examined. The middle meatus, and sphenoethmoid recess was examined bilaterally.    -There is evidence of a rightward nasal septal deviation with a narrow nasal valve on the right. Presents of nasal vestibulitis on the right side. Moderate mucosal edema.  -There were no complications. Tolerated well without complication. I attest that I was present for and did the entire procedure myself. This note was generated completely or in part utilizing Dragon dictation speech recognition software. Occasionally, words are mistranscribed and despite editing, the text may contain inaccuracies due to incorrect word recognition. If further clarification is needed please contact the office at [phone]. An electronic signature was used to authenticate this note.     --Colette Contreras MD

## 2023-12-13 ENCOUNTER — PROCEDURE VISIT (OUTPATIENT)
Dept: AUDIOLOGY | Age: 73
End: 2023-12-13
Payer: MEDICARE

## 2023-12-13 ENCOUNTER — OFFICE VISIT (OUTPATIENT)
Dept: ENT CLINIC | Age: 73
End: 2023-12-13
Payer: MEDICARE

## 2023-12-13 VITALS
WEIGHT: 136 LBS | BODY MASS INDEX: 23.22 KG/M2 | HEIGHT: 64 IN | HEART RATE: 75 BPM | DIASTOLIC BLOOD PRESSURE: 72 MMHG | RESPIRATION RATE: 16 BRPM | SYSTOLIC BLOOD PRESSURE: 145 MMHG | OXYGEN SATURATION: 99 %

## 2023-12-13 DIAGNOSIS — H90.3 SENSORINEURAL HEARING LOSS (SNHL) OF BOTH EARS: Primary | ICD-10-CM

## 2023-12-13 DIAGNOSIS — J34.89 NASAL VESTIBULITIS: ICD-10-CM

## 2023-12-13 PROCEDURE — 1036F TOBACCO NON-USER: CPT | Performed by: STUDENT IN AN ORGANIZED HEALTH CARE EDUCATION/TRAINING PROGRAM

## 2023-12-13 PROCEDURE — G8427 DOCREV CUR MEDS BY ELIG CLIN: HCPCS | Performed by: STUDENT IN AN ORGANIZED HEALTH CARE EDUCATION/TRAINING PROGRAM

## 2023-12-13 PROCEDURE — G8420 CALC BMI NORM PARAMETERS: HCPCS | Performed by: STUDENT IN AN ORGANIZED HEALTH CARE EDUCATION/TRAINING PROGRAM

## 2023-12-13 PROCEDURE — 92557 COMPREHENSIVE HEARING TEST: CPT | Performed by: AUDIOLOGIST

## 2023-12-13 PROCEDURE — G8399 PT W/DXA RESULTS DOCUMENT: HCPCS | Performed by: STUDENT IN AN ORGANIZED HEALTH CARE EDUCATION/TRAINING PROGRAM

## 2023-12-13 PROCEDURE — 1123F ACP DISCUSS/DSCN MKR DOCD: CPT | Performed by: STUDENT IN AN ORGANIZED HEALTH CARE EDUCATION/TRAINING PROGRAM

## 2023-12-13 PROCEDURE — G8484 FLU IMMUNIZE NO ADMIN: HCPCS | Performed by: STUDENT IN AN ORGANIZED HEALTH CARE EDUCATION/TRAINING PROGRAM

## 2023-12-13 PROCEDURE — 99213 OFFICE O/P EST LOW 20 MIN: CPT | Performed by: STUDENT IN AN ORGANIZED HEALTH CARE EDUCATION/TRAINING PROGRAM

## 2023-12-13 PROCEDURE — 1090F PRES/ABSN URINE INCON ASSESS: CPT | Performed by: STUDENT IN AN ORGANIZED HEALTH CARE EDUCATION/TRAINING PROGRAM

## 2023-12-13 PROCEDURE — 3017F COLORECTAL CA SCREEN DOC REV: CPT | Performed by: STUDENT IN AN ORGANIZED HEALTH CARE EDUCATION/TRAINING PROGRAM

## 2023-12-13 PROCEDURE — 92567 TYMPANOMETRY: CPT | Performed by: AUDIOLOGIST

## 2024-01-15 RX ORDER — OMEPRAZOLE 40 MG/1
CAPSULE, DELAYED RELEASE ORAL
Qty: 90 CAPSULE | Refills: 0 | Status: SHIPPED | OUTPATIENT
Start: 2024-01-15

## 2024-01-15 NOTE — TELEPHONE ENCOUNTER
Refill Request:     Last Office Visit:  12/13/2023     Next Scheduled Visit : 1/25/2024     Medication Requested:   Requested Prescriptions     Pending Prescriptions Disp Refills    omeprazole (PRILOSEC) 40 MG delayed release capsule [Pharmacy Med Name: Omeprazole Oral Capsule Delayed Release 40 MG] 90 capsule 0     Sig: TAKE 1 CAPSULE BY MOUTH EVERY DAY 30 MINUTES BEFORE EVENING MEAL        Pharmacy:    [id number] - [place], [place] - [address] - P [phone] - F [phone]  [address]  [facility] [address]  Phone: [phone] Fax: [phone]

## 2024-01-25 ENCOUNTER — OFFICE VISIT (OUTPATIENT)
Dept: ENT CLINIC | Age: 74
End: 2024-01-25
Payer: MEDICARE

## 2024-01-25 VITALS — OXYGEN SATURATION: 90 % | DIASTOLIC BLOOD PRESSURE: 75 MMHG | SYSTOLIC BLOOD PRESSURE: 133 MMHG | HEART RATE: 73 BPM

## 2024-01-25 DIAGNOSIS — J34.89 NASAL VESTIBULITIS: ICD-10-CM

## 2024-01-25 DIAGNOSIS — K13.70 MOUTH LESION: Primary | ICD-10-CM

## 2024-01-25 DIAGNOSIS — H90.3 SENSORINEURAL HEARING LOSS (SNHL) OF BOTH EARS: ICD-10-CM

## 2024-01-25 DIAGNOSIS — K11.8 SALIVARY GLAND OBSTRUCTION: ICD-10-CM

## 2024-01-25 PROCEDURE — 3017F COLORECTAL CA SCREEN DOC REV: CPT | Performed by: STUDENT IN AN ORGANIZED HEALTH CARE EDUCATION/TRAINING PROGRAM

## 2024-01-25 PROCEDURE — 1036F TOBACCO NON-USER: CPT | Performed by: STUDENT IN AN ORGANIZED HEALTH CARE EDUCATION/TRAINING PROGRAM

## 2024-01-25 PROCEDURE — 99213 OFFICE O/P EST LOW 20 MIN: CPT | Performed by: STUDENT IN AN ORGANIZED HEALTH CARE EDUCATION/TRAINING PROGRAM

## 2024-01-25 PROCEDURE — G8399 PT W/DXA RESULTS DOCUMENT: HCPCS | Performed by: STUDENT IN AN ORGANIZED HEALTH CARE EDUCATION/TRAINING PROGRAM

## 2024-01-25 PROCEDURE — G8484 FLU IMMUNIZE NO ADMIN: HCPCS | Performed by: STUDENT IN AN ORGANIZED HEALTH CARE EDUCATION/TRAINING PROGRAM

## 2024-01-25 PROCEDURE — 1090F PRES/ABSN URINE INCON ASSESS: CPT | Performed by: STUDENT IN AN ORGANIZED HEALTH CARE EDUCATION/TRAINING PROGRAM

## 2024-01-25 PROCEDURE — G8420 CALC BMI NORM PARAMETERS: HCPCS | Performed by: STUDENT IN AN ORGANIZED HEALTH CARE EDUCATION/TRAINING PROGRAM

## 2024-01-25 PROCEDURE — 1123F ACP DISCUSS/DSCN MKR DOCD: CPT | Performed by: STUDENT IN AN ORGANIZED HEALTH CARE EDUCATION/TRAINING PROGRAM

## 2024-01-25 PROCEDURE — G8428 CUR MEDS NOT DOCUMENT: HCPCS | Performed by: STUDENT IN AN ORGANIZED HEALTH CARE EDUCATION/TRAINING PROGRAM

## 2024-01-25 RX ORDER — TRIAMCINOLONE ACETONIDE 0.1 %
PASTE (GRAM) DENTAL
Qty: 1 EACH | Refills: 0 | Status: SHIPPED | OUTPATIENT
Start: 2024-01-25

## 2024-01-25 NOTE — PROGRESS NOTES
noticed worsening of her hearing and now her hearing aids are no longer working.  She was seen at an outside audiologist and told that she needed an updated pair of hearing aids.  No surgery to her ears or significant loud noise exposure.    Patient is also having issues related to an new onset nasal pain and nasal obstruction.  It is worse on the left side than the right side.  She has not been using her fluticasone or Atrovent regularly for her chronic rhinitis.  She is getting some increased drainage out of her right nostril.  No recent sinus infection.    Recently had cataract surgery on the left side.  Set to get on the right side.  Wanted to check and ensure that it was okay to proceed with the right side since she is having a nasal issue.    Update 12/13/2023:    Patient presents today for follow-up.  States that her nasal vestibulitis is stable.  Not really using any nasal sprays in her nose.    Patient is getting difficulty using her hearing aids and feels like they are not functioning very well.  They are approximately 2 years old.    Update 1/25/2024:    Patient presents today for follow-up.  Since I saw her last she has been using the mupirocin and Atrovent as needed.  Still working on getting her hearing aids fixed.    Patient has a new issue related to inflammation of her salivary glands.  She has been dealing with getting a new denture plate with her dentist for the last year.  There has been some issues where it did not fit correctly and then had to be cut out.  She now has some inflammation along the floor of her mouth.  Her dentist told her that she had clogged saliva glands and to follow-up with an ENT.  No other lumps or bumps of the head and neck.  It seems like every time the denture plate is in it swells up.    REVIEW OF SYSTEMS  The following systems were reviewed and revealed the following in addition to any already discussed in the HPI:    PHYSICAL EXAM    GENERAL: No acute distress, alert

## 2024-02-12 ENCOUNTER — TELEPHONE (OUTPATIENT)
Dept: ENT CLINIC | Age: 74
End: 2024-02-12

## 2024-02-12 NOTE — TELEPHONE ENCOUNTER
Pt calling, has a question about her salivary gland that she saw Dr Cortez for; she states there's an area under her tongue that is still puff y and red but is not bleeding or painful. She is supposed to start wearing her new dentures and wants to make sure Dr Cortez thinks this is ok to do. It has not been bleeding and it is not bothering her. She is supposed to see her dentist tomorrow. Please call to advise.

## 2024-04-12 NOTE — TELEPHONE ENCOUNTER
Refill Request:     Last Office Visit:  1/25/2024     Next Scheduled Visit : Visit date not found     Medication Requested:   Requested Prescriptions     Pending Prescriptions Disp Refills    omeprazole (PRILOSEC) 40 MG delayed release capsule [Pharmacy Med Name: Omeprazole Oral Capsule Delayed Release 40 MG] 90 capsule 0     Sig: TAKE 1 CAPSULE BY MOUTH EVERY DAY 30 MINUTES BEFORE EVENING MEALS        Pharmacy:    [id number] - [place], [place] - [address] - P [phone] - F [phone]  [address]  [facility] [address]  Phone: [phone] Fax: [phone]

## 2024-04-15 RX ORDER — OMEPRAZOLE 40 MG/1
CAPSULE, DELAYED RELEASE ORAL
Qty: 90 CAPSULE | Refills: 0 | Status: SHIPPED | OUTPATIENT
Start: 2024-04-15

## 2024-04-23 ENCOUNTER — OFFICE VISIT (OUTPATIENT)
Dept: ENT CLINIC | Age: 74
End: 2024-04-23
Payer: MEDICARE

## 2024-04-23 VITALS
RESPIRATION RATE: 16 BRPM | DIASTOLIC BLOOD PRESSURE: 76 MMHG | HEIGHT: 64 IN | SYSTOLIC BLOOD PRESSURE: 131 MMHG | HEART RATE: 75 BPM | OXYGEN SATURATION: 98 % | BODY MASS INDEX: 22.88 KG/M2 | WEIGHT: 134 LBS

## 2024-04-23 DIAGNOSIS — J31.0 CHRONIC RHINITIS: ICD-10-CM

## 2024-04-23 DIAGNOSIS — K11.8 SALIVARY GLAND OBSTRUCTION: ICD-10-CM

## 2024-04-23 DIAGNOSIS — K13.70 MOUTH LESION: Primary | ICD-10-CM

## 2024-04-23 DIAGNOSIS — H69.93 DYSFUNCTION OF BOTH EUSTACHIAN TUBES: ICD-10-CM

## 2024-04-23 DIAGNOSIS — H90.3 SENSORINEURAL HEARING LOSS (SNHL) OF BOTH EARS: ICD-10-CM

## 2024-04-23 PROCEDURE — 1090F PRES/ABSN URINE INCON ASSESS: CPT | Performed by: STUDENT IN AN ORGANIZED HEALTH CARE EDUCATION/TRAINING PROGRAM

## 2024-04-23 PROCEDURE — 1036F TOBACCO NON-USER: CPT | Performed by: STUDENT IN AN ORGANIZED HEALTH CARE EDUCATION/TRAINING PROGRAM

## 2024-04-23 PROCEDURE — 3017F COLORECTAL CA SCREEN DOC REV: CPT | Performed by: STUDENT IN AN ORGANIZED HEALTH CARE EDUCATION/TRAINING PROGRAM

## 2024-04-23 PROCEDURE — G8427 DOCREV CUR MEDS BY ELIG CLIN: HCPCS | Performed by: STUDENT IN AN ORGANIZED HEALTH CARE EDUCATION/TRAINING PROGRAM

## 2024-04-23 PROCEDURE — G8420 CALC BMI NORM PARAMETERS: HCPCS | Performed by: STUDENT IN AN ORGANIZED HEALTH CARE EDUCATION/TRAINING PROGRAM

## 2024-04-23 PROCEDURE — G8399 PT W/DXA RESULTS DOCUMENT: HCPCS | Performed by: STUDENT IN AN ORGANIZED HEALTH CARE EDUCATION/TRAINING PROGRAM

## 2024-04-23 PROCEDURE — 1123F ACP DISCUSS/DSCN MKR DOCD: CPT | Performed by: STUDENT IN AN ORGANIZED HEALTH CARE EDUCATION/TRAINING PROGRAM

## 2024-04-23 PROCEDURE — 99214 OFFICE O/P EST MOD 30 MIN: CPT | Performed by: STUDENT IN AN ORGANIZED HEALTH CARE EDUCATION/TRAINING PROGRAM

## 2024-04-23 RX ORDER — AMOXICILLIN 500 MG/1
500 CAPSULE ORAL 2 TIMES DAILY
Qty: 14 CAPSULE | Refills: 0 | Status: SHIPPED | OUTPATIENT
Start: 2024-04-23 | End: 2024-04-30

## 2024-04-23 RX ORDER — TRIAMCINOLONE ACETONIDE 0.1 %
PASTE (GRAM) DENTAL
Qty: 1 EACH | Refills: 0 | Status: SHIPPED | OUTPATIENT
Start: 2024-04-23

## 2024-04-23 NOTE — PROGRESS NOTES
[facility]  DIVISION OF OTOLARYNGOLOGY- HEAD & NECK SURGERY  CONSULT      Porsche Starkey (:  1950) is a 73 y.o. female, here for evaluation of the following chief complaint(s):  Follow-up (Mouth lesions )      ASSESSMENT/PLAN:  1. Mouth lesion  2. Salivary gland obstruction  3. Sensorineural hearing loss (SNHL) of both ears  4. Dysfunction of both eustachian tubes  5. Chronic rhinitis        This is a very pleasant 73 y.o. female here today for evaluation of the the above-noted complaints.      -I will refill her triamcinolone paste for irritation along her gingiva  -We again discussed that there appears to be inflammation along [place]'s ducts bilaterally from her denture plate.  There is no obvious lesions.  Nothing to biopsy.  -Continue mupirocin as needed for nasal vestibulitis  -Atrovent as needed chronic rhinitis.  Will add fluticasone 1 spray twice daily for chronic rhinitis and eustachian tube dysfunction.  -Audiogram from 2023 reviewed.  Shows evidence of normal sloping to moderately severe sensorineural hearing loss with good word recognition scores and type a tympanograms.  Repeat audiogram in 12 months or sooner if there is a significant change in her hearing.  -Trial of amoxicillin to see if there is some chronic inflammation that could be contributing to her symptoms.    The risks, benefits and alternatives to antibiotics were discussed with patient in detail including but not limited to the risk of GI upset, xerostomia, anaphylaxis and rash/ sun sensitivity.  The patient was instructed to contact me should they develop any adverse reactions or have other concerns.          SUBJECTIVE/OBJECTIVE:  Epistaxis     Ear Problem      Prior treatment notes:    With regards to her new onset otalgia, I suspect that this is related to an improperly fitting hearing aid event.  The area of tenderness is located in the tail of parotid region but there is no masses there and her oral cavity and

## 2024-04-24 ENCOUNTER — TELEPHONE (OUTPATIENT)
Dept: ENT CLINIC | Age: 74
End: 2024-04-24

## 2024-04-24 DIAGNOSIS — K13.70 MOUTH LESION: Primary | ICD-10-CM

## 2024-04-24 RX ORDER — IPRATROPIUM BROMIDE 42 UG/1
2 SPRAY, METERED NASAL 4 TIMES DAILY PRN
Qty: 1 EACH | Refills: 1 | Status: SHIPPED | OUTPATIENT
Start: 2024-04-24 | End: 2024-07-23

## 2024-04-24 NOTE — TELEPHONE ENCOUNTER
Pt calling, saw dr key yesterday; he was going to send script for nose spray but pharmacy did not receive; looks like he was going to prescribe fluticasone.    He did prescribe the triamcinolone acetonide (KENALOG) 0.1 % paste , but she does not need this so is not going to pick this up.    Also, she said he was referring her to a \"certain kind of dentist\" for \"inflammation along Pender's ducts bilaterally from her denture plate \" - she does not remember what kind of dentist and is asking for this information.    Please call.

## 2024-04-25 ENCOUNTER — TELEPHONE (OUTPATIENT)
Dept: ENT CLINIC | Age: 74
End: 2024-04-25

## 2024-04-25 DIAGNOSIS — K13.70 MOUTH LESION: Primary | ICD-10-CM

## 2024-04-25 NOTE — TELEPHONE ENCOUNTER
Patient calling because she was referred to [facility] for dental issues . When she called to schedule they told the patient they are no longer accepting patients. She is calling for another referral. Please advisw

## 2024-06-21 ENCOUNTER — HOSPITAL ENCOUNTER (OUTPATIENT)
Age: 74
End: 2024-06-21
Attending: PSYCHIATRY & NEUROLOGY
Payer: MEDICARE

## 2024-06-21 ENCOUNTER — HOSPITAL ENCOUNTER (OUTPATIENT)
Dept: VASCULAR LAB | Age: 74
End: 2024-06-21
Attending: PSYCHIATRY & NEUROLOGY
Payer: MEDICARE

## 2024-06-21 VITALS — WEIGHT: 134 LBS | BODY MASS INDEX: 22.88 KG/M2 | HEIGHT: 64 IN

## 2024-06-21 DIAGNOSIS — R55 SYNCOPE AND COLLAPSE: ICD-10-CM

## 2024-06-21 DIAGNOSIS — R42 DIZZINESS: ICD-10-CM

## 2024-06-21 LAB
ECHO AO ASC DIAM: 2.6 CM
ECHO AO ASCENDING AORTA INDEX: 1.58 CM/M2
ECHO AO ROOT DIAM: 2.8 CM
ECHO AO ROOT INDEX: 1.7 CM/M2
ECHO AV AREA PEAK VELOCITY: 1.9 CM2
ECHO AV AREA VTI: 1.8 CM2
ECHO AV AREA/BSA PEAK VELOCITY: 1.2 CM2/M2
ECHO AV AREA/BSA VTI: 1.1 CM2/M2
ECHO AV MEAN GRADIENT: 3 MMHG
ECHO AV MEAN VELOCITY: 0.9 M/S
ECHO AV PEAK GRADIENT: 5 MMHG
ECHO AV PEAK VELOCITY: 1.2 M/S
ECHO AV VELOCITY RATIO: 0.67
ECHO AV VTI: 26.9 CM
ECHO BSA: 1.66 M2
ECHO BSA: 1.66 M2
ECHO EST RA PRESSURE: 3 MMHG
ECHO IVC EXP: 1.7 CM
ECHO LA AREA 2C: 13.3 CM2
ECHO LA AREA 4C: 14.3 CM2
ECHO LA MAJOR AXIS: 4.2 CM
ECHO LA MINOR AXIS: 4.3 CM
ECHO LA VOL BP: 36 ML (ref 22–52)
ECHO LA VOL MOD A2C: 33 ML (ref 22–52)
ECHO LA VOL MOD A4C: 38 ML (ref 22–52)
ECHO LA VOL/BSA BIPLANE: 22 ML/M2 (ref 16–34)
ECHO LA VOLUME INDEX MOD A2C: 20 ML/M2 (ref 16–34)
ECHO LA VOLUME INDEX MOD A4C: 23 ML/M2 (ref 16–34)
ECHO LV E' LATERAL VELOCITY: 9 CM/S
ECHO LV E' SEPTAL VELOCITY: 7 CM/S
ECHO LV FRACTIONAL SHORTENING: 43 % (ref 28–44)
ECHO LV INTERNAL DIMENSION DIASTOLE INDEX: 2.55 CM/M2
ECHO LV INTERNAL DIMENSION DIASTOLIC: 4.2 CM (ref 3.9–5.3)
ECHO LV INTERNAL DIMENSION SYSTOLIC INDEX: 1.45 CM/M2
ECHO LV INTERNAL DIMENSION SYSTOLIC: 2.4 CM
ECHO LV IVSD: 0.8 CM (ref 0.6–0.9)
ECHO LV MASS 2D: 101.3 G (ref 67–162)
ECHO LV MASS INDEX 2D: 61.4 G/M2 (ref 43–95)
ECHO LV POSTERIOR WALL DIASTOLIC: 0.8 CM (ref 0.6–0.9)
ECHO LV RELATIVE WALL THICKNESS RATIO: 0.38
ECHO LVOT AREA: 2.8 CM2
ECHO LVOT AV VTI INDEX: 0.65
ECHO LVOT DIAM: 1.9 CM
ECHO LVOT MEAN GRADIENT: 2 MMHG
ECHO LVOT PEAK GRADIENT: 3 MMHG
ECHO LVOT PEAK VELOCITY: 0.8 M/S
ECHO LVOT STROKE VOLUME INDEX: 30.2 ML/M2
ECHO LVOT SV: 49.9 ML
ECHO LVOT VTI: 17.6 CM
ECHO MV A VELOCITY: 0.69 M/S
ECHO MV AREA VTI: 2.2 CM2
ECHO MV E DECELERATION TIME (DT): 207 MS
ECHO MV E VELOCITY: 0.73 M/S
ECHO MV E/A RATIO: 1.06
ECHO MV E/E' LATERAL: 8.11
ECHO MV E/E' RATIO (AVERAGED): 9.27
ECHO MV E/E' SEPTAL: 10.43
ECHO MV LVOT VTI INDEX: 1.31
ECHO MV MAX VELOCITY: 0.9 M/S
ECHO MV MEAN GRADIENT: 2 MMHG
ECHO MV MEAN VELOCITY: 0.6 M/S
ECHO MV PEAK GRADIENT: 3 MMHG
ECHO MV VTI: 23 CM
ECHO PV MAX VELOCITY: 0.9 M/S
ECHO PV PEAK GRADIENT: 3 MMHG
ECHO RA AREA 4C: 15.3 CM2
ECHO RA END SYSTOLIC VOLUME APICAL 4 CHAMBER INDEX BSA: 24 ML/M2
ECHO RA VOLUME: 39 ML
ECHO RIGHT VENTRICULAR SYSTOLIC PRESSURE (RVSP): 26 MMHG
ECHO RV BASAL DIMENSION: 3.9 CM
ECHO RV FREE WALL PEAK S': 13 CM/S
ECHO RV MID DIMENSION: 3.3 CM
ECHO RV TAPSE: 2.8 CM (ref 1.7–?)
ECHO TV REGURGITANT MAX VELOCITY: 2.41 M/S
ECHO TV REGURGITANT PEAK GRADIENT: 23 MMHG
VAS LEFT CCA DIST EDV: 23.3 CM/S
VAS LEFT CCA DIST PSV: 93.7 CM/S
VAS LEFT CCA MID EDV: 27.2 CM/S
VAS LEFT CCA MID PSV: 97.6 CM/S
VAS LEFT CCA PROX EDV: 22 CM/S
VAS LEFT CCA PROX PSV: 105.5 CM/S
VAS LEFT ECA EDV: 11.6 CM/S
VAS LEFT ECA PSV: 84.6 CM/S
VAS LEFT ICA DIST EDV: 34.5 CM/S
VAS LEFT ICA DIST PSV: 105.8 CM/S
VAS LEFT ICA MID EDV: 28.5 CM/S
VAS LEFT ICA MID PSV: 89.8 CM/S
VAS LEFT ICA PROX EDV: 23.3 CM/S
VAS LEFT ICA PROX PSV: 72.9 CM/S
VAS LEFT ICA/CCA PSV: 1.1 NO UNITS
VAS LEFT VERTEBRAL EDV: 18.6 CM/S
VAS LEFT VERTEBRAL PSV: 64.7 CM/S
VAS RIGHT CCA DIST EDV: 20.7 CM/S
VAS RIGHT CCA DIST PSV: 92.5 CM/S
VAS RIGHT CCA MID EDV: 20.7 CM/S
VAS RIGHT CCA MID PSV: 104.2 CM/S
VAS RIGHT CCA PROX EDV: 19.4 CM/S
VAS RIGHT CCA PROX PSV: 70.3 CM/S
VAS RIGHT ECA EDV: 14.2 CM/S
VAS RIGHT ECA PSV: 78.1 CM/S
VAS RIGHT ICA DIST EDV: 27.2 CM/S
VAS RIGHT ICA DIST PSV: 91.1 CM/S
VAS RIGHT ICA MID EDV: 33.8 CM/S
VAS RIGHT ICA MID PSV: 104.2 CM/S
VAS RIGHT ICA PROX EDV: 20.7 CM/S
VAS RIGHT ICA PROX PSV: 70.3 CM/S
VAS RIGHT ICA/CCA PSV: 1.1 NO UNITS
VAS RIGHT VERTEBRAL EDV: 16.8 CM/S
VAS RIGHT VERTEBRAL PSV: 72.9 CM/S

## 2024-06-21 PROCEDURE — 93880 EXTRACRANIAL BILAT STUDY: CPT

## 2024-06-21 PROCEDURE — 93306 TTE W/DOPPLER COMPLETE: CPT | Performed by: INTERNAL MEDICINE

## 2024-06-21 PROCEDURE — 93306 TTE W/DOPPLER COMPLETE: CPT

## 2024-06-21 PROCEDURE — 93880 EXTRACRANIAL BILAT STUDY: CPT | Performed by: SURGERY

## 2024-07-08 RX ORDER — OMEPRAZOLE 40 MG/1
CAPSULE, DELAYED RELEASE ORAL
Qty: 90 CAPSULE | Refills: 0 | OUTPATIENT
Start: 2024-07-08

## 2024-07-08 NOTE — TELEPHONE ENCOUNTER
Refill Request:     Last Office Visit:  Visit date not found     Next Scheduled Visit : Visit date not found     Medication Requested:   Requested Prescriptions     Pending Prescriptions Disp Refills    omeprazole (PRILOSEC) 40 MG delayed release capsule [Pharmacy Med Name: Omeprazole Oral Capsule Delayed Release 40 MG] 90 capsule 0     Sig: TAKE 1 CAPSULE BY MOUTH EVERY DAY 30 MINUTES BEFORE EVENING MEALS        Pharmacy:    [id number] - [place], [place] - [address] - P [phone] - F [phone]  [address]  [facility] [address]  Phone: [phone] Fax: [phone]

## 2024-07-09 NOTE — TELEPHONE ENCOUNTER
Last appointment: 4/23/2024  Next appointment: Visit date not found         Routed to wrong Physician patient seen by Dr. Cortez

## 2024-07-10 RX ORDER — OMEPRAZOLE 40 MG/1
CAPSULE, DELAYED RELEASE ORAL
Qty: 90 CAPSULE | Refills: 1 | Status: SHIPPED | OUTPATIENT
Start: 2024-07-10

## 2024-11-08 ENCOUNTER — HOSPITAL ENCOUNTER (OUTPATIENT)
Dept: WOMENS IMAGING | Age: 74
Discharge: HOME | End: 2024-11-08
Payer: MEDICARE

## 2024-11-08 VITALS — WEIGHT: 126 LBS | HEIGHT: 63 IN | BODY MASS INDEX: 22.32 KG/M2

## 2024-11-08 DIAGNOSIS — Z12.31 BREAST CANCER SCREENING BY MAMMOGRAM: ICD-10-CM

## 2024-11-08 PROCEDURE — 77063 BREAST TOMOSYNTHESIS BI: CPT

## 2025-03-07 RX ORDER — OMEPRAZOLE 40 MG/1
CAPSULE, DELAYED RELEASE ORAL
Qty: 90 CAPSULE | Refills: 0 | OUTPATIENT
Start: 2025-03-07

## 2025-03-07 NOTE — TELEPHONE ENCOUNTER
Refill Request:     Last Office Visit:  4/23/2024     Next Scheduled Visit : Visit date not found     Medication Requested:   Requested Prescriptions     Pending Prescriptions Disp Refills    omeprazole (PRILOSEC) 40 MG delayed release capsule [Pharmacy Med Name: Omeprazole Oral Capsule Delayed Release 40 MG] 90 capsule 0     Sig: TAKE 1 CAPSULE BY MOUTH EVERY DAY 30 MINUTES BEFORE EVENING MEALS        Pharmacy:    [id number] - [place], [place] - [address] - P [phone] - F [phone]  [address]  [facility] [address]  Phone: [phone] Fax: [phone]

## 2025-03-17 RX ORDER — OMEPRAZOLE 40 MG/1
CAPSULE, DELAYED RELEASE ORAL
Qty: 90 CAPSULE | Refills: 0 | Status: SHIPPED | OUTPATIENT
Start: 2025-03-17

## 2025-04-24 ENCOUNTER — OFFICE VISIT (OUTPATIENT)
Dept: ENT CLINIC | Age: 75
End: 2025-04-24
Payer: MEDICARE

## 2025-04-24 VITALS
DIASTOLIC BLOOD PRESSURE: 76 MMHG | WEIGHT: 130 LBS | BODY MASS INDEX: 23.04 KG/M2 | HEIGHT: 63 IN | SYSTOLIC BLOOD PRESSURE: 115 MMHG

## 2025-04-24 DIAGNOSIS — K11.8 SALIVARY GLAND OBSTRUCTION: ICD-10-CM

## 2025-04-24 DIAGNOSIS — H90.3 SENSORINEURAL HEARING LOSS (SNHL) OF BOTH EARS: ICD-10-CM

## 2025-04-24 DIAGNOSIS — K13.70 MOUTH LESION: Primary | ICD-10-CM

## 2025-04-24 DIAGNOSIS — J31.0 CHRONIC RHINITIS: ICD-10-CM

## 2025-04-24 PROCEDURE — 3017F COLORECTAL CA SCREEN DOC REV: CPT | Performed by: STUDENT IN AN ORGANIZED HEALTH CARE EDUCATION/TRAINING PROGRAM

## 2025-04-24 PROCEDURE — 99213 OFFICE O/P EST LOW 20 MIN: CPT | Performed by: STUDENT IN AN ORGANIZED HEALTH CARE EDUCATION/TRAINING PROGRAM

## 2025-04-24 PROCEDURE — G8420 CALC BMI NORM PARAMETERS: HCPCS | Performed by: STUDENT IN AN ORGANIZED HEALTH CARE EDUCATION/TRAINING PROGRAM

## 2025-04-24 PROCEDURE — 1123F ACP DISCUSS/DSCN MKR DOCD: CPT | Performed by: STUDENT IN AN ORGANIZED HEALTH CARE EDUCATION/TRAINING PROGRAM

## 2025-04-24 PROCEDURE — G8399 PT W/DXA RESULTS DOCUMENT: HCPCS | Performed by: STUDENT IN AN ORGANIZED HEALTH CARE EDUCATION/TRAINING PROGRAM

## 2025-04-24 PROCEDURE — 1159F MED LIST DOCD IN RCRD: CPT | Performed by: STUDENT IN AN ORGANIZED HEALTH CARE EDUCATION/TRAINING PROGRAM

## 2025-04-24 PROCEDURE — G8427 DOCREV CUR MEDS BY ELIG CLIN: HCPCS | Performed by: STUDENT IN AN ORGANIZED HEALTH CARE EDUCATION/TRAINING PROGRAM

## 2025-04-24 PROCEDURE — 1090F PRES/ABSN URINE INCON ASSESS: CPT | Performed by: STUDENT IN AN ORGANIZED HEALTH CARE EDUCATION/TRAINING PROGRAM

## 2025-04-24 PROCEDURE — 1036F TOBACCO NON-USER: CPT | Performed by: STUDENT IN AN ORGANIZED HEALTH CARE EDUCATION/TRAINING PROGRAM

## 2025-04-24 RX ORDER — OMEPRAZOLE 40 MG/1
CAPSULE, DELAYED RELEASE ORAL
Qty: 90 CAPSULE | Refills: 0 | Status: SHIPPED | OUTPATIENT
Start: 2025-04-24

## 2025-04-24 NOTE — PROGRESS NOTES
[facility]  DIVISION OF OTOLARYNGOLOGY- HEAD & NECK SURGERY  CONSULT      Porsche Starkey (:  1950) is a 74 y.o. female, here for evaluation of the following chief complaint(s):  Other (Pt stated that she is here for a check up so that she can get her medication refilled when its time. /omeprazole (PRILOSEC) 40 MG delayed release capsule/)      ASSESSMENT/PLAN:  1. Mouth lesion  2. Salivary gland obstruction  3. Sensorineural hearing loss (SNHL) of both ears  4. Chronic rhinitis          This is a very pleasant 74 y.o. female here today for evaluation of the the above-noted complaints.        -Refill of omeprazole for laryngopharyngeal reflux and phlegm in the back of the throat  -Triamcinolone paste as needed irritation along gingiva  -Stop mupirocin.  Nasal vestibulitis has resolved  -Atrovent as needed chronic rhinitis  -Discussed an updated audiogram and seeing if she would be a candidate for new hearing aids.          SUBJECTIVE/OBJECTIVE:  Epistaxis     Ear Problem      Prior treatment notes:    With regards to her new onset otalgia, I suspect that this is related to an improperly fitting hearing aid event.  The area of tenderness is located in the tail of parotid region but there is no masses there and her oral cavity and oropharynx appear clear.    I will start her on clobetasol up to the left external auditory canal.  A prescription was sent to the pharmacy for this today.    The patient has a prior history of a traumatic fall with nasal bone fracture approximately 6 months ago.  She now has a deviated nasal dorsum with a narrowed nasal valve on the right combined with a right-sided septal deviation  I suspect that this is contributing to her nasal obstruction on the side.  If she were to need surgery to open up her nose she may need an open septorhinoplasty with osteotomies.  We discussed that we are going to continue to observe this.    Continue Atrovent for vasomotor rhinitis.    Continue

## 2025-05-20 NOTE — PROGRESS NOTES
Porsche Starkey  1950.74 y.o. female   [id number]      HEARING AID EVALUATION    Porsche Starkey was seen today, 5/22/2025 , for a Hearing Aid Evaluation following audiologic evaluation on 5/22/25 (Same Day).  Patient is an experienced hearing aid user, currently wearing OTC RICs from [employer] .    Patient was found to have no direct insurance benefit for hearing aids with access to possible hearing aid discount through third party  ([employer]). Patient understands they are responsible for any cost insurance does not cover. Hearing aid benefit worksheet scanned into media tab.     DATE: 5/22/2025     LIFESTYLE EVALUATION:      Primary Complaint: Hearing in background noise, speech clarity     Phone Difficulty:   Is connectivity important to you?: Yes    Flaco Use: yes - [employer]    Group Activites: work, spending time with family, baseball games    HEARING AID DEMONSTRATION:      During today's assessment, the patient wore Oticon Intent 1 demo hearing aid devices with size 2/85 receivers and 6mm Open domes. Reviewed hearing aid orientation and maintenance. Discussed realistic expectations.     Connected devices to software. The patient reported clear sound quality. She was satisfied with the Oticon devices.     The patient was then fitted with Phonak Audeo I90-R demo hearing aid devices with size 2M receivers and Medium Open domes. Reviewed hearing aid orientation and maintenance. Discussed realistic expectations.     Connected devices to software. Ran Feedback Manager. The patient reported the sound quality was fine, but she felt her own voice sounded dull. Made adjustments accordingly in the software for own voice processing. Discussed switching to a Vented dome. Patient reports interest in the Oticon devices.     HEARING AID EVALUATION:      After a discussion of evaluation results, discussion of levels of technology and prices, and lifestyle assessment. Porsche Starkey has decided to consider the

## 2025-05-20 NOTE — PROGRESS NOTES
Porsche Starkey   1950, 74 y.o. female   [id number]       Referring Provider: SELF    Reason for Visit: Evaluation of suspected change in hearing, tinnitus, or balance.    ADULT AUDIOLOGIC EVALUATION      Porsche Starkey is a 74 y.o. female seen today, 5/22/2025 , for a recheck audiologic evaluation.  Patient was seen for Hearing Aid Evaluation following today's evaluation.    AUDIOLOGIC AND OTHER PERTINENT MEDICAL HISTORY:      Porsche Starkey has a known asymmetrical hearing loss and currently wears OTC AUGUSTINA hearing aids. She notes a history of loud noise exposure. She reports occasional pain behind the left pinna.     She denied otalgia, aural fullness, otorrhea, tinnitus, dizziness, imbalance, history of ear surgery, and family history of hearing loss    Date: 5/22/2025     IMPRESSIONS:      Today's results revealed an asymmetrical hearing loss @ 0.75-1 kHz and 6 kHz (Right Ear better than the Left Ear)    Fair speech understanding when in quiet. Tympanometry indicates Right Ear normal middle ear function and Left Ear normal middle ear function.    Discussed test results and implications with patient. Hearing aids recommended at this time. Follow medical recommendations of Ankur Swartz MD and Martin Cortez MD    ASSESSMENT AND FINDINGS:     Otoscopy unremarkable.    RIGHT EAR:  Hearing Sensitivity: Normal sloping to Moderately-Severe Sensorineural hearing loss  Speech Recognition Threshold: 30 dB HL  Word Recognition: Fair 72%, based on NU-6 by-difficulty list at 70 dBHL with 40 dB HL masking noise using recorded speech stimuli.    Tympanometry: Normal peak pressure and compliance, Type A tympanogram, consistent with normal middle ear function.       LEFT EAR:  Hearing Sensitivity: Normal sloping to Moderately-Severe Sensorineural hearing loss  Speech Recognition Threshold: 35 dB HL  Word Recognition: Fair 76%, based on NU-6 by-difficulty list at 75 dBHL with 45 dB HL masking noise using recorded speech

## 2025-05-22 ENCOUNTER — PROCEDURE VISIT (OUTPATIENT)
Dept: AUDIOLOGY | Age: 75
End: 2025-05-22

## 2025-05-22 DIAGNOSIS — H90.3 SENSORINEURAL HEARING LOSS (SNHL) OF BOTH EARS: Primary | ICD-10-CM

## 2025-05-22 DIAGNOSIS — H91.8X3 ASYMMETRICAL HEARING LOSS: Primary | ICD-10-CM

## 2025-05-22 DIAGNOSIS — H90.3 SENSORINEURAL HEARING LOSS (SNHL) OF BOTH EARS: ICD-10-CM

## (undated) DEVICE — BNDG,ELSTC,MATRIX,STRL,4"X5YD,LF,HOOK&LP: Brand: MEDLINE

## (undated) DEVICE — Z CONVERTED USE 2273164 BANDAGE COMPR W4INXL4 1/2YD E EC SGL LAYERED CLP CLSR ECONO

## (undated) DEVICE — GAUZE,SPONGE,4"X4",8PLY,STRL,LF,10/TRAY: Brand: MEDLINE

## (undated) DEVICE — PADDING CAST W3INXL4YD COT BLEND MIC PLEAT UNDERCAST SPEC

## (undated) DEVICE — INTENDED FOR TISSUE SEPARATION, AND OTHER PROCEDURES THAT REQUIRE A SHARP SURGICAL BLADE TO PUNCTURE OR CUT.: Brand: BARD-PARKER ® STAINLESS STEEL BLADES

## (undated) DEVICE — MEDICINE CUP, GRADUATED, STER: Brand: MEDLINE

## (undated) DEVICE — BANDAGE COMPR W4INXL12FT E DISP ESMARCH EVEN

## (undated) DEVICE — PADDING CAST W4INXL4YD ST COT RAYON MICROPLEATED HIGHLY

## (undated) DEVICE — HYPODERMIC SAFETY NEEDLE: Brand: MAGELLAN

## (undated) DEVICE — TOWEL,OR,DSP,ST,BLUE,STD,4/PK,20PK/CS: Brand: MEDLINE

## (undated) DEVICE — SYRINGE, LUER LOCK, 10ML: Brand: MEDLINE

## (undated) DEVICE — ZIMMER® STERILE DISPOSABLE TOURNIQUET CUFF WITH PLC, DUAL PORT, SINGLE BLADDER, 18 IN. (46 CM)

## (undated) DEVICE — CURITY NON-ADHERENT STRIPS: Brand: CURITY

## (undated) DEVICE — GLOVE ORANGE PI 8   MSG9080

## (undated) DEVICE — STOCKINETTE CAST W6XL48IN WHT POLY IMPERV PRECUT SYN DBL

## (undated) DEVICE — STOCKINETTE,IMPERVIOUS,12X48,STERILE: Brand: MEDLINE

## (undated) DEVICE — T-DRAPE,EXTREMITY,STERILE: Brand: MEDLINE

## (undated) DEVICE — KIT OR ROOM TURNOVER W/STRAP

## (undated) DEVICE — PACK PROCEDURE SURG EXTREMITY MFFOP CUST

## (undated) DEVICE — SUTURE PROL SZ 3-0 L18IN NONABSORBABLE BLU L24MM FS-1 3/8 8684G

## (undated) DEVICE — SOLUTION IV IRRIG 500ML 0.9% SODIUM CHL 2F7123